# Patient Record
Sex: MALE | Race: WHITE | NOT HISPANIC OR LATINO | URBAN - NONMETROPOLITAN AREA
[De-identification: names, ages, dates, MRNs, and addresses within clinical notes are randomized per-mention and may not be internally consistent; named-entity substitution may affect disease eponyms.]

---

## 2024-06-30 ENCOUNTER — APPOINTMENT (OUTPATIENT)
Dept: CARDIOLOGY | Facility: HOSPITAL | Age: 54
End: 2024-06-30

## 2024-06-30 ENCOUNTER — HOSPITAL ENCOUNTER (EMERGENCY)
Facility: HOSPITAL | Age: 54
Discharge: OTHER INSTITUTION | End: 2024-07-01
Attending: FAMILY MEDICINE

## 2024-06-30 DIAGNOSIS — E11.10 DIABETIC KETOACIDOSIS WITHOUT COMA ASSOCIATED WITH TYPE 2 DIABETES MELLITUS (MULTI): Primary | ICD-10-CM

## 2024-06-30 DIAGNOSIS — R41.89 UNRESPONSIVE: ICD-10-CM

## 2024-06-30 DIAGNOSIS — F19.929 DRUG INTOXICATION WITH COMPLICATION (MULTI): ICD-10-CM

## 2024-06-30 DIAGNOSIS — Z98.890 REQUIRED EMERGENCY INTUBATION: ICD-10-CM

## 2024-06-30 DIAGNOSIS — Z45.2 ENCOUNTER FOR CENTRAL LINE PLACEMENT: ICD-10-CM

## 2024-06-30 LAB
ANION GAP BLDA CALCULATED.4IONS-SCNC: 38 MMO/L (ref 10–25)
BASE EXCESS BLDA CALC-SCNC: -28.2 MMOL/L (ref -2–3)
BASOPHILS # BLD AUTO: 0.1 X10*3/UL (ref 0–0.1)
BASOPHILS NFR BLD AUTO: 0.6 %
BODY TEMPERATURE: ABNORMAL
CA-I BLDA-SCNC: 1.28 MMOL/L (ref 1.1–1.33)
CHLORIDE BLDA-SCNC: 89 MMOL/L (ref 98–107)
EOSINOPHIL # BLD AUTO: 0 X10*3/UL (ref 0–0.7)
EOSINOPHIL NFR BLD AUTO: 0 %
ERYTHROCYTE [DISTWIDTH] IN BLOOD BY AUTOMATED COUNT: 12.8 % (ref 11.5–14.5)
GLUCOSE BLD MANUAL STRIP-MCNC: >600 MG/DL (ref 74–99)
GLUCOSE BLDA-MCNC: >685 MG/DL (ref 74–99)
HCO3 BLDA-SCNC: 1.6 MMOL/L (ref 22–26)
HCT VFR BLD AUTO: 52.2 % (ref 41–52)
HCT VFR BLD EST: 45 % (ref 41–52)
HGB BLD-MCNC: 16 G/DL (ref 13.5–17.5)
HGB BLDA-MCNC: 15.1 G/DL (ref 13.5–17.5)
IMM GRANULOCYTES # BLD AUTO: 0.22 X10*3/UL (ref 0–0.7)
IMM GRANULOCYTES NFR BLD AUTO: 1.4 % (ref 0–0.9)
INHALED O2 CONCENTRATION: 21 %
LACTATE BLDA-SCNC: 4.5 MMOL/L (ref 0.4–2)
LYMPHOCYTES # BLD AUTO: 2.24 X10*3/UL (ref 1.2–4.8)
LYMPHOCYTES NFR BLD AUTO: 14.6 %
MCH RBC QN AUTO: 31.7 PG (ref 26–34)
MCHC RBC AUTO-ENTMCNC: 30.7 G/DL (ref 32–36)
MCV RBC AUTO: 104 FL (ref 80–100)
MONOCYTES # BLD AUTO: 0.87 X10*3/UL (ref 0.1–1)
MONOCYTES NFR BLD AUTO: 5.7 %
NEUTROPHILS # BLD AUTO: 11.96 X10*3/UL (ref 1.2–7.7)
NEUTROPHILS NFR BLD AUTO: 77.7 %
NRBC BLD-RTO: 0 /100 WBCS (ref 0–0)
OXYHGB MFR BLDA: 97.5 % (ref 94–98)
PCO2 BLDA: 7 MM HG (ref 38–42)
PH BLDA: 6.97 PH (ref 7.38–7.42)
PLATELET # BLD AUTO: 453 X10*3/UL (ref 150–450)
PO2 BLDA: 131 MM HG (ref 85–95)
POTASSIUM BLDA-SCNC: 7.7 MMOL/L (ref 3.5–5.3)
RBC # BLD AUTO: 5.04 X10*6/UL (ref 4.5–5.9)
SAO2 % BLDA: 100 % (ref 94–100)
SODIUM BLDA-SCNC: 121 MMOL/L (ref 136–145)
WBC # BLD AUTO: 15.4 X10*3/UL (ref 4.4–11.3)

## 2024-06-30 PROCEDURE — 96374 THER/PROPH/DIAG INJ IV PUSH: CPT | Mod: 59

## 2024-06-30 PROCEDURE — 93005 ELECTROCARDIOGRAM TRACING: CPT

## 2024-06-30 PROCEDURE — 36556 INSERT NON-TUNNEL CV CATH: CPT | Performed by: FAMILY MEDICINE

## 2024-06-30 PROCEDURE — 36415 COLL VENOUS BLD VENIPUNCTURE: CPT | Performed by: FAMILY MEDICINE

## 2024-06-30 PROCEDURE — 80053 COMPREHEN METABOLIC PANEL: CPT | Performed by: FAMILY MEDICINE

## 2024-06-30 PROCEDURE — 85025 COMPLETE CBC W/AUTO DIFF WBC: CPT | Performed by: FAMILY MEDICINE

## 2024-06-30 PROCEDURE — 84484 ASSAY OF TROPONIN QUANT: CPT | Performed by: FAMILY MEDICINE

## 2024-06-30 PROCEDURE — 96376 TX/PRO/DX INJ SAME DRUG ADON: CPT | Mod: 59

## 2024-06-30 PROCEDURE — 82435 ASSAY OF BLOOD CHLORIDE: CPT | Performed by: FAMILY MEDICINE

## 2024-06-30 PROCEDURE — 96375 TX/PRO/DX INJ NEW DRUG ADDON: CPT | Mod: 59

## 2024-06-30 PROCEDURE — 2500000004 HC RX 250 GENERAL PHARMACY W/ HCPCS (ALT 636 FOR OP/ED): Performed by: FAMILY MEDICINE

## 2024-06-30 PROCEDURE — 36600 WITHDRAWAL OF ARTERIAL BLOOD: CPT

## 2024-06-30 PROCEDURE — 99291 CRITICAL CARE FIRST HOUR: CPT

## 2024-06-30 PROCEDURE — 2500000004 HC RX 250 GENERAL PHARMACY W/ HCPCS (ALT 636 FOR OP/ED)

## 2024-06-30 PROCEDURE — 82947 ASSAY GLUCOSE BLOOD QUANT: CPT

## 2024-06-30 RX ORDER — NALOXONE HYDROCHLORIDE 1 MG/ML
INJECTION INTRAMUSCULAR; INTRAVENOUS; SUBCUTANEOUS
Status: COMPLETED
Start: 2024-06-30 | End: 2024-06-30

## 2024-06-30 RX ORDER — LORAZEPAM 2 MG/ML
INJECTION INTRAMUSCULAR
Status: COMPLETED
Start: 2024-06-30 | End: 2024-06-30

## 2024-06-30 RX ORDER — NALOXONE HYDROCHLORIDE 1 MG/ML
2 INJECTION INTRAMUSCULAR; INTRAVENOUS; SUBCUTANEOUS ONCE
Status: COMPLETED | OUTPATIENT
Start: 2024-06-30 | End: 2024-06-30

## 2024-06-30 RX ORDER — LORAZEPAM 2 MG/ML
1 INJECTION INTRAMUSCULAR ONCE
Status: COMPLETED | OUTPATIENT
Start: 2024-06-30 | End: 2024-06-30

## 2024-06-30 ASSESSMENT — PAIN SCALES - GENERAL: PAINLEVEL_OUTOF10: 0 - NO PAIN

## 2024-06-30 ASSESSMENT — PAIN - FUNCTIONAL ASSESSMENT: PAIN_FUNCTIONAL_ASSESSMENT: UNABLE TO SELF-REPORT

## 2024-07-01 ENCOUNTER — APPOINTMENT (OUTPATIENT)
Dept: RADIOLOGY | Facility: HOSPITAL | Age: 54
End: 2024-07-01

## 2024-07-01 ENCOUNTER — HOSPITAL ENCOUNTER (INPATIENT)
Facility: HOSPITAL | Age: 54
LOS: 1 days | Discharge: HOME | End: 2024-07-02
Attending: INTERNAL MEDICINE | Admitting: INTERNAL MEDICINE

## 2024-07-01 ENCOUNTER — APPOINTMENT (OUTPATIENT)
Dept: CARDIOLOGY | Facility: HOSPITAL | Age: 54
End: 2024-07-01

## 2024-07-01 ENCOUNTER — DOCUMENTATION (OUTPATIENT)
Dept: CRITICAL CARE MEDICINE | Facility: HOSPITAL | Age: 54
End: 2024-07-01

## 2024-07-01 VITALS
TEMPERATURE: 97.5 F | WEIGHT: 150 LBS | DIASTOLIC BLOOD PRESSURE: 72 MMHG | OXYGEN SATURATION: 100 % | HEIGHT: 69 IN | BODY MASS INDEX: 22.22 KG/M2 | RESPIRATION RATE: 30 BRPM | SYSTOLIC BLOOD PRESSURE: 112 MMHG | HEART RATE: 92 BPM

## 2024-07-01 DIAGNOSIS — I15.9 SECONDARY HYPERTENSION: ICD-10-CM

## 2024-07-01 DIAGNOSIS — E10.10 DKA, TYPE 1, NOT AT GOAL (MULTI): Primary | ICD-10-CM

## 2024-07-01 DIAGNOSIS — F32.0 CURRENT MILD EPISODE OF MAJOR DEPRESSIVE DISORDER WITHOUT PRIOR EPISODE (CMS-HCC): ICD-10-CM

## 2024-07-01 PROBLEM — E11.10 DIABETIC KETOACIDOSIS WITHOUT COMA ASSOCIATED WITH TYPE 2 DIABETES MELLITUS (MULTI): Status: ACTIVE | Noted: 2024-07-01

## 2024-07-01 PROBLEM — Z98.890 REQUIRED EMERGENCY INTUBATION: Status: ACTIVE | Noted: 2024-07-01

## 2024-07-01 PROBLEM — R41.89 UNRESPONSIVE: Status: ACTIVE | Noted: 2024-07-01

## 2024-07-01 PROBLEM — Z45.2 ENCOUNTER FOR CENTRAL LINE PLACEMENT: Status: ACTIVE | Noted: 2024-07-01

## 2024-07-01 PROBLEM — F19.929: Status: ACTIVE | Noted: 2024-07-01

## 2024-07-01 LAB
ALBUMIN SERPL BCP-MCNC: 2.7 G/DL (ref 3.4–5)
ALBUMIN SERPL BCP-MCNC: 3.2 G/DL (ref 3.4–5)
ALBUMIN SERPL BCP-MCNC: 3.5 G/DL (ref 3.4–5)
ALBUMIN SERPL BCP-MCNC: 4.2 G/DL (ref 3.4–5)
ALP SERPL-CCNC: 205 U/L (ref 33–120)
ALP SERPL-CCNC: 282 U/L (ref 33–120)
ALT SERPL W P-5'-P-CCNC: 104 U/L (ref 10–52)
ALT SERPL W P-5'-P-CCNC: 75 U/L (ref 10–52)
AMPHETAMINES UR QL SCN: ABNORMAL
AMYLASE SERPL-CCNC: 238 U/L (ref 29–103)
ANION GAP BLDA CALCULATED.4IONS-SCNC: 32 MMO/L (ref 10–25)
ANION GAP BLDA CALCULATED.4IONS-SCNC: 35 MMO/L (ref 10–25)
ANION GAP BLDV CALCULATED.4IONS-SCNC: 15 MMOL/L (ref 10–25)
ANION GAP SERPL CALC-SCNC: 11 MMOL/L (ref 10–20)
ANION GAP SERPL CALC-SCNC: 23 MMOL/L (ref 10–20)
ANION GAP SERPL CALC-SCNC: 34 MMOL/L (ref 10–20)
ANION GAP SERPL CALC-SCNC: 43 MMOL/L (ref 10–20)
ANION GAP SERPL CALC-SCNC: 9 MMOL/L (ref 10–20)
ANION GAP SERPL CALC-SCNC: 9 MMOL/L (ref 10–20)
APPEARANCE UR: CLEAR
ARTERIAL PATENCY WRIST A: POSITIVE
AST SERPL W P-5'-P-CCNC: 26 U/L (ref 9–39)
AST SERPL W P-5'-P-CCNC: 39 U/L (ref 9–39)
ATRIAL RATE: 191 BPM
ATRIAL RATE: 78 BPM
B-OH-BUTYR SERPL-SCNC: 8.81 MMOL/L (ref 0.02–0.27)
BARBITURATES UR QL SCN: ABNORMAL
BASE EXCESS BLDA CALC-SCNC: -21.4 MMOL/L (ref -2–3)
BASE EXCESS BLDA CALC-SCNC: -29.6 MMOL/L (ref -2–3)
BASE EXCESS BLDV CALC-SCNC: -6 MMOL/L (ref -2–3)
BENZODIAZ UR QL SCN: ABNORMAL
BILIRUB SERPL-MCNC: 0.3 MG/DL (ref 0–1.2)
BILIRUB SERPL-MCNC: 0.3 MG/DL (ref 0–1.2)
BILIRUB UR STRIP.AUTO-MCNC: NEGATIVE MG/DL
BNP SERPL-MCNC: 136 PG/ML (ref 0–99)
BODY TEMPERATURE: ABNORMAL
BUN SERPL-MCNC: 17 MG/DL (ref 6–23)
BUN SERPL-MCNC: 18 MG/DL (ref 6–23)
BUN SERPL-MCNC: 24 MG/DL (ref 6–23)
BUN SERPL-MCNC: 28 MG/DL (ref 6–23)
BUN SERPL-MCNC: 30 MG/DL (ref 6–23)
BUN SERPL-MCNC: 31 MG/DL (ref 6–23)
BZE UR QL SCN: ABNORMAL
CA-I BLDA-SCNC: 1.19 MMOL/L (ref 1.1–1.33)
CA-I BLDA-SCNC: 1.23 MMOL/L (ref 1.1–1.33)
CA-I BLDV-SCNC: 1.11 MMOL/L (ref 1.1–1.33)
CALCIUM SERPL-MCNC: 7.2 MG/DL (ref 8.6–10.3)
CALCIUM SERPL-MCNC: 7.2 MG/DL (ref 8.6–10.3)
CALCIUM SERPL-MCNC: 7.3 MG/DL (ref 8.6–10.3)
CALCIUM SERPL-MCNC: 7.6 MG/DL (ref 8.6–10.3)
CALCIUM SERPL-MCNC: 7.8 MG/DL (ref 8.6–10.3)
CALCIUM SERPL-MCNC: 9.3 MG/DL (ref 8.6–10.3)
CANNABINOIDS UR QL SCN: ABNORMAL
CARDIAC TROPONIN I PNL SERPL HS: 10 NG/L (ref 0–20)
CARDIAC TROPONIN I PNL SERPL HS: 14 NG/L (ref 0–20)
CHLORIDE BLDA-SCNC: 99 MMOL/L (ref 98–107)
CHLORIDE BLDA-SCNC: 99 MMOL/L (ref 98–107)
CHLORIDE BLDV-SCNC: 112 MMOL/L (ref 98–107)
CHLORIDE SERPL-SCNC: 109 MMOL/L (ref 98–107)
CHLORIDE SERPL-SCNC: 111 MMOL/L (ref 98–107)
CHLORIDE SERPL-SCNC: 113 MMOL/L (ref 98–107)
CHLORIDE SERPL-SCNC: 113 MMOL/L (ref 98–107)
CHLORIDE SERPL-SCNC: 84 MMOL/L (ref 98–107)
CHLORIDE SERPL-SCNC: 98 MMOL/L (ref 98–107)
CK SERPL-CCNC: 99 U/L (ref 0–325)
CO2 SERPL-SCNC: 15 MMOL/L (ref 21–32)
CO2 SERPL-SCNC: 24 MMOL/L (ref 21–32)
CO2 SERPL-SCNC: 24 MMOL/L (ref 21–32)
CO2 SERPL-SCNC: 25 MMOL/L (ref 21–32)
CO2 SERPL-SCNC: 4 MMOL/L (ref 21–32)
CO2 SERPL-SCNC: 6 MMOL/L (ref 21–32)
COLOR UR: ABNORMAL
CREAT SERPL-MCNC: 1.07 MG/DL (ref 0.5–1.3)
CREAT SERPL-MCNC: 1.13 MG/DL (ref 0.5–1.3)
CREAT SERPL-MCNC: 1.32 MG/DL (ref 0.5–1.3)
CREAT SERPL-MCNC: 1.65 MG/DL (ref 0.5–1.3)
CREAT SERPL-MCNC: 1.86 MG/DL (ref 0.5–1.3)
CREAT SERPL-MCNC: 1.94 MG/DL (ref 0.5–1.3)
EGFRCR SERPLBLD CKD-EPI 2021: 41 ML/MIN/1.73M*2
EGFRCR SERPLBLD CKD-EPI 2021: 43 ML/MIN/1.73M*2
EGFRCR SERPLBLD CKD-EPI 2021: 49 ML/MIN/1.73M*2
EGFRCR SERPLBLD CKD-EPI 2021: 64 ML/MIN/1.73M*2
EGFRCR SERPLBLD CKD-EPI 2021: 78 ML/MIN/1.73M*2
EGFRCR SERPLBLD CKD-EPI 2021: 83 ML/MIN/1.73M*2
ERYTHROCYTE [DISTWIDTH] IN BLOOD BY AUTOMATED COUNT: 12.7 % (ref 11.5–14.5)
FENTANYL+NORFENTANYL UR QL SCN: ABNORMAL
GLUCOSE BLD MANUAL STRIP-MCNC: 138 MG/DL (ref 74–99)
GLUCOSE BLD MANUAL STRIP-MCNC: 142 MG/DL (ref 74–99)
GLUCOSE BLD MANUAL STRIP-MCNC: 151 MG/DL (ref 74–99)
GLUCOSE BLD MANUAL STRIP-MCNC: 153 MG/DL (ref 74–99)
GLUCOSE BLD MANUAL STRIP-MCNC: 156 MG/DL (ref 74–99)
GLUCOSE BLD MANUAL STRIP-MCNC: 157 MG/DL (ref 74–99)
GLUCOSE BLD MANUAL STRIP-MCNC: 158 MG/DL (ref 74–99)
GLUCOSE BLD MANUAL STRIP-MCNC: 161 MG/DL (ref 74–99)
GLUCOSE BLD MANUAL STRIP-MCNC: 163 MG/DL (ref 74–99)
GLUCOSE BLD MANUAL STRIP-MCNC: 177 MG/DL (ref 74–99)
GLUCOSE BLD MANUAL STRIP-MCNC: 193 MG/DL (ref 74–99)
GLUCOSE BLD MANUAL STRIP-MCNC: 194 MG/DL (ref 74–99)
GLUCOSE BLD MANUAL STRIP-MCNC: 204 MG/DL (ref 74–99)
GLUCOSE BLD MANUAL STRIP-MCNC: 213 MG/DL (ref 74–99)
GLUCOSE BLD MANUAL STRIP-MCNC: 255 MG/DL (ref 74–99)
GLUCOSE BLD MANUAL STRIP-MCNC: 285 MG/DL (ref 74–99)
GLUCOSE BLD MANUAL STRIP-MCNC: 452 MG/DL (ref 74–99)
GLUCOSE BLD MANUAL STRIP-MCNC: >600 MG/DL (ref 74–99)
GLUCOSE BLD MANUAL STRIP-MCNC: >600 MG/DL (ref 74–99)
GLUCOSE BLDA-MCNC: >685 MG/DL (ref 74–99)
GLUCOSE BLDA-MCNC: >685 MG/DL (ref 74–99)
GLUCOSE BLDV-MCNC: 170 MG/DL (ref 74–99)
GLUCOSE SERPL-MCNC: 139 MG/DL (ref 74–99)
GLUCOSE SERPL-MCNC: 145 MG/DL (ref 74–99)
GLUCOSE SERPL-MCNC: 155 MG/DL (ref 74–99)
GLUCOSE SERPL-MCNC: 292 MG/DL (ref 74–99)
GLUCOSE SERPL-MCNC: 549 MG/DL (ref 74–99)
GLUCOSE SERPL-MCNC: 763 MG/DL (ref 74–99)
GLUCOSE SERPL-MCNC: 996 MG/DL (ref 74–99)
GLUCOSE UR STRIP.AUTO-MCNC: ABNORMAL MG/DL
GRAN CASTS #/AREA UR COMP ASSIST: ABNORMAL /LPF
HCO3 BLDA-SCNC: 3.6 MMOL/L (ref 22–26)
HCO3 BLDA-SCNC: 5.4 MMOL/L (ref 22–26)
HCO3 BLDV-SCNC: 18.8 MMOL/L (ref 22–26)
HCT VFR BLD AUTO: 42.2 % (ref 41–52)
HCT VFR BLD EST: 38 % (ref 41–52)
HCT VFR BLD EST: 41 % (ref 41–52)
HCT VFR BLD EST: 44 % (ref 41–52)
HGB BLD-MCNC: 13.2 G/DL (ref 13.5–17.5)
HGB BLDA-MCNC: 13.6 G/DL (ref 13.5–17.5)
HGB BLDA-MCNC: 14.5 G/DL (ref 13.5–17.5)
HGB BLDV-MCNC: 12.7 G/DL (ref 13.5–17.5)
HOLD SPECIMEN: NORMAL
INHALED O2 CONCENTRATION: 100 %
INHALED O2 CONCENTRATION: 30 %
INHALED O2 CONCENTRATION: 30 %
KETONES UR STRIP.AUTO-MCNC: ABNORMAL MG/DL
LACTATE BLDA-SCNC: 4.7 MMOL/L (ref 0.4–2)
LACTATE BLDA-SCNC: 6 MMOL/L (ref 0.4–2)
LACTATE BLDV-SCNC: 3.7 MMOL/L (ref 0.4–2)
LACTATE BLDV-SCNC: 6 MMOL/L (ref 0.4–2)
LACTATE SERPL-SCNC: 1.7 MMOL/L (ref 0.4–2)
LACTATE SERPL-SCNC: 1.9 MMOL/L (ref 0.4–2)
LACTATE SERPL-SCNC: 2.2 MMOL/L (ref 0.4–2)
LACTATE SERPL-SCNC: 4 MMOL/L (ref 0.4–2)
LACTATE SERPL-SCNC: 4.4 MMOL/L (ref 0.4–2)
LACTATE SERPL-SCNC: 5.4 MMOL/L (ref 0.4–2)
LACTATE SERPL-SCNC: 5.5 MMOL/L (ref 0.4–2)
LEUKOCYTE ESTERASE UR QL STRIP.AUTO: NEGATIVE
LIPASE SERPL-CCNC: 92 U/L (ref 9–82)
MAGNESIUM SERPL-MCNC: 2.27 MG/DL (ref 1.6–2.4)
MAGNESIUM SERPL-MCNC: 2.65 MG/DL (ref 1.6–2.4)
MCH RBC QN AUTO: 30.8 PG (ref 26–34)
MCHC RBC AUTO-ENTMCNC: 31.3 G/DL (ref 32–36)
MCV RBC AUTO: 99 FL (ref 80–100)
METHADONE UR QL SCN: ABNORMAL
NITRITE UR QL STRIP.AUTO: NEGATIVE
NRBC BLD-RTO: 0 /100 WBCS (ref 0–0)
OPIATES UR QL SCN: ABNORMAL
OXYCODONE+OXYMORPHONE UR QL SCN: ABNORMAL
OXYHGB MFR BLDA: 97.9 % (ref 94–98)
OXYHGB MFR BLDA: 98.2 % (ref 94–98)
OXYHGB MFR BLDV: 82.1 % (ref 45–75)
P AXIS: -15 DEGREES
P AXIS: 68 DEGREES
P OFFSET: 191 MS
P ONSET: 138 MS
PCO2 BLDA: 16 MM HG (ref 38–42)
PCO2 BLDA: 21 MM HG (ref 38–42)
PCO2 BLDV: 34 MM HG (ref 41–51)
PCP UR QL SCN: ABNORMAL
PEEP CMH2O: 5 CM H2O
PH BLDA: 6.84 PH (ref 7.38–7.42)
PH BLDA: 7.14 PH (ref 7.38–7.42)
PH BLDV: 7.35 PH (ref 7.33–7.43)
PH UR STRIP.AUTO: 5 [PH]
PHOSPHATE SERPL-MCNC: 1.2 MG/DL (ref 2.5–4.9)
PHOSPHATE SERPL-MCNC: 2.5 MG/DL (ref 2.5–4.9)
PHOSPHATE SERPL-MCNC: 5.5 MG/DL (ref 2.5–4.9)
PHOSPHATE SERPL-MCNC: 9.3 MG/DL (ref 2.5–4.9)
PLATELET # BLD AUTO: 333 X10*3/UL (ref 150–450)
PO2 BLDA: 126 MM HG (ref 85–95)
PO2 BLDA: 534 MM HG (ref 85–95)
PO2 BLDV: 43 MM HG (ref 35–45)
POTASSIUM BLDA-SCNC: 4.7 MMOL/L (ref 3.5–5.3)
POTASSIUM BLDA-SCNC: 6 MMOL/L (ref 3.5–5.3)
POTASSIUM BLDV-SCNC: 4.1 MMOL/L (ref 3.5–5.3)
POTASSIUM SERPL-SCNC: 3.6 MMOL/L (ref 3.5–5.3)
POTASSIUM SERPL-SCNC: 3.7 MMOL/L (ref 3.5–5.3)
POTASSIUM SERPL-SCNC: 3.9 MMOL/L (ref 3.5–5.3)
POTASSIUM SERPL-SCNC: 4.1 MMOL/L (ref 3.5–5.3)
POTASSIUM SERPL-SCNC: 4.5 MMOL/L (ref 3.5–5.3)
POTASSIUM SERPL-SCNC: 6.7 MMOL/L (ref 3.5–5.3)
PR INTERVAL: 174 MS
PROT SERPL-MCNC: 6 G/DL (ref 6.4–8.2)
PROT SERPL-MCNC: 7.5 G/DL (ref 6.4–8.2)
PROT UR STRIP.AUTO-MCNC: NEGATIVE MG/DL
Q ONSET: 206 MS
Q ONSET: 225 MS
QRS COUNT: 13 BEATS
QRS COUNT: 32 BEATS
QRS DURATION: 106 MS
QRS DURATION: 86 MS
QT INTERVAL: 270 MS
QT INTERVAL: 438 MS
QTC CALCULATION(BAZETT): 481 MS
QTC CALCULATION(BAZETT): 499 MS
QTC FREDERICIA: 397 MS
QTC FREDERICIA: 478 MS
R AXIS: -44 DEGREES
R AXIS: 40 DEGREES
RBC # BLD AUTO: 4.28 X10*6/UL (ref 4.5–5.9)
RBC # UR STRIP.AUTO: ABNORMAL /UL
RBC #/AREA URNS AUTO: ABNORMAL /HPF
SAO2 % BLDA: 100 % (ref 94–100)
SAO2 % BLDA: 100 % (ref 94–100)
SAO2 % BLDV: 83 % (ref 45–75)
SARS-COV-2 RNA RESP QL NAA+PROBE: NOT DETECTED
SODIUM BLDA-SCNC: 129 MMOL/L (ref 136–145)
SODIUM BLDA-SCNC: 135 MMOL/L (ref 136–145)
SODIUM BLDV-SCNC: 142 MMOL/L (ref 136–145)
SODIUM SERPL-SCNC: 124 MMOL/L (ref 136–145)
SODIUM SERPL-SCNC: 133 MMOL/L (ref 136–145)
SODIUM SERPL-SCNC: 141 MMOL/L (ref 136–145)
SODIUM SERPL-SCNC: 142 MMOL/L (ref 136–145)
SODIUM SERPL-SCNC: 143 MMOL/L (ref 136–145)
SODIUM SERPL-SCNC: 144 MMOL/L (ref 136–145)
SP GR UR STRIP.AUTO: 1.02
SPECIMEN DRAWN FROM PATIENT: ABNORMAL
SQUAMOUS #/AREA URNS AUTO: ABNORMAL /HPF
T AXIS: 3 DEGREES
T AXIS: 63 DEGREES
T OFFSET: 341 MS
T OFFSET: 444 MS
TIDAL VOLUME: 450 ML
UROBILINOGEN UR STRIP.AUTO-MCNC: <2 MG/DL
VENTILATOR MODE: ABNORMAL
VENTILATOR RATE: 30 BPM
VENTRICULAR RATE: 191 BPM
VENTRICULAR RATE: 78 BPM
WBC # BLD AUTO: 24.2 X10*3/UL (ref 4.4–11.3)
WBC #/AREA URNS AUTO: ABNORMAL /HPF

## 2024-07-01 PROCEDURE — 31500 INSERT EMERGENCY AIRWAY: CPT

## 2024-07-01 PROCEDURE — 2500000005 HC RX 250 GENERAL PHARMACY W/O HCPCS

## 2024-07-01 PROCEDURE — 31720 CLEARANCE OF AIRWAYS: CPT

## 2024-07-01 PROCEDURE — 84132 ASSAY OF SERUM POTASSIUM: CPT

## 2024-07-01 PROCEDURE — 73551 X-RAY EXAM OF FEMUR 1: CPT | Mod: RIGHT SIDE | Performed by: RADIOLOGY

## 2024-07-01 PROCEDURE — 94681 O2 UPTK CO2 OUTP % O2 XTRC: CPT

## 2024-07-01 PROCEDURE — 37799 UNLISTED PX VASCULAR SURGERY: CPT

## 2024-07-01 PROCEDURE — 80307 DRUG TEST PRSMV CHEM ANLYZR: CPT | Performed by: FAMILY MEDICINE

## 2024-07-01 PROCEDURE — 36415 COLL VENOUS BLD VENIPUNCTURE: CPT | Performed by: FAMILY MEDICINE

## 2024-07-01 PROCEDURE — 70450 CT HEAD/BRAIN W/O DYE: CPT | Performed by: RADIOLOGY

## 2024-07-01 PROCEDURE — 2500000002 HC RX 250 W HCPCS SELF ADMINISTERED DRUGS (ALT 637 FOR MEDICARE OP, ALT 636 FOR OP/ED)

## 2024-07-01 PROCEDURE — 80048 BASIC METABOLIC PNL TOTAL CA: CPT | Mod: CCI

## 2024-07-01 PROCEDURE — 94002 VENT MGMT INPAT INIT DAY: CPT

## 2024-07-01 PROCEDURE — C9113 INJ PANTOPRAZOLE SODIUM, VIA: HCPCS

## 2024-07-01 PROCEDURE — 84484 ASSAY OF TROPONIN QUANT: CPT

## 2024-07-01 PROCEDURE — 87075 CULTR BACTERIA EXCEPT BLOOD: CPT | Mod: CONLAB | Performed by: FAMILY MEDICINE

## 2024-07-01 PROCEDURE — 71045 X-RAY EXAM CHEST 1 VIEW: CPT

## 2024-07-01 PROCEDURE — 2500000004 HC RX 250 GENERAL PHARMACY W/ HCPCS (ALT 636 FOR OP/ED)

## 2024-07-01 PROCEDURE — 51702 INSERT TEMP BLADDER CATH: CPT | Mod: XS | Performed by: FAMILY MEDICINE

## 2024-07-01 PROCEDURE — 84100 ASSAY OF PHOSPHORUS: CPT | Performed by: FAMILY MEDICINE

## 2024-07-01 PROCEDURE — 2500000004 HC RX 250 GENERAL PHARMACY W/ HCPCS (ALT 636 FOR OP/ED): Performed by: FAMILY MEDICINE

## 2024-07-01 PROCEDURE — 93005 ELECTROCARDIOGRAM TRACING: CPT

## 2024-07-01 PROCEDURE — 87635 SARS-COV-2 COVID-19 AMP PRB: CPT

## 2024-07-01 PROCEDURE — 99291 CRITICAL CARE FIRST HOUR: CPT | Performed by: INTERNAL MEDICINE

## 2024-07-01 PROCEDURE — 36600 WITHDRAWAL OF ARTERIAL BLOOD: CPT

## 2024-07-01 PROCEDURE — 99291 CRITICAL CARE FIRST HOUR: CPT

## 2024-07-01 PROCEDURE — 82010 KETONE BODYS QUAN: CPT | Mod: GEALAB

## 2024-07-01 PROCEDURE — 83735 ASSAY OF MAGNESIUM: CPT | Performed by: FAMILY MEDICINE

## 2024-07-01 PROCEDURE — 82150 ASSAY OF AMYLASE: CPT

## 2024-07-01 PROCEDURE — 83690 ASSAY OF LIPASE: CPT

## 2024-07-01 PROCEDURE — 94799 UNLISTED PULMONARY SVC/PX: CPT

## 2024-07-01 PROCEDURE — 2500000005 HC RX 250 GENERAL PHARMACY W/O HCPCS: Performed by: INTERNAL MEDICINE

## 2024-07-01 PROCEDURE — 83735 ASSAY OF MAGNESIUM: CPT

## 2024-07-01 PROCEDURE — 87081 CULTURE SCREEN ONLY: CPT | Mod: GEALAB

## 2024-07-01 PROCEDURE — 83880 ASSAY OF NATRIURETIC PEPTIDE: CPT

## 2024-07-01 PROCEDURE — 83605 ASSAY OF LACTIC ACID: CPT

## 2024-07-01 PROCEDURE — 5A1935Z RESPIRATORY VENTILATION, LESS THAN 24 CONSECUTIVE HOURS: ICD-10-PCS

## 2024-07-01 PROCEDURE — 85027 COMPLETE CBC AUTOMATED: CPT

## 2024-07-01 PROCEDURE — 96376 TX/PRO/DX INJ SAME DRUG ADON: CPT | Mod: 59

## 2024-07-01 PROCEDURE — 87086 URINE CULTURE/COLONY COUNT: CPT | Mod: CONLAB | Performed by: FAMILY MEDICINE

## 2024-07-01 PROCEDURE — 82550 ASSAY OF CK (CPK): CPT

## 2024-07-01 PROCEDURE — 70450 CT HEAD/BRAIN W/O DYE: CPT

## 2024-07-01 PROCEDURE — 82947 ASSAY GLUCOSE BLOOD QUANT: CPT

## 2024-07-01 PROCEDURE — 2500000005 HC RX 250 GENERAL PHARMACY W/O HCPCS: Performed by: FAMILY MEDICINE

## 2024-07-01 PROCEDURE — 2020000001 HC ICU ROOM DAILY

## 2024-07-01 PROCEDURE — 76705 ECHO EXAM OF ABDOMEN: CPT | Performed by: STUDENT IN AN ORGANIZED HEALTH CARE EDUCATION/TRAINING PROGRAM

## 2024-07-01 PROCEDURE — 96375 TX/PRO/DX INJ NEW DRUG ADDON: CPT | Mod: 59

## 2024-07-01 PROCEDURE — 99292 CRITICAL CARE ADDL 30 MIN: CPT | Performed by: INTERNAL MEDICINE

## 2024-07-01 PROCEDURE — 73551 X-RAY EXAM OF FEMUR 1: CPT | Mod: RT

## 2024-07-01 PROCEDURE — 96365 THER/PROPH/DIAG IV INF INIT: CPT | Mod: 59

## 2024-07-01 PROCEDURE — 71045 X-RAY EXAM CHEST 1 VIEW: CPT | Performed by: RADIOLOGY

## 2024-07-01 PROCEDURE — 83605 ASSAY OF LACTIC ACID: CPT | Performed by: FAMILY MEDICINE

## 2024-07-01 PROCEDURE — 81001 URINALYSIS AUTO W/SCOPE: CPT | Performed by: FAMILY MEDICINE

## 2024-07-01 PROCEDURE — 84100 ASSAY OF PHOSPHORUS: CPT

## 2024-07-01 PROCEDURE — 0BH17EZ INSERTION OF ENDOTRACHEAL AIRWAY INTO TRACHEA, VIA NATURAL OR ARTIFICIAL OPENING: ICD-10-PCS

## 2024-07-01 PROCEDURE — 71045 X-RAY EXAM CHEST 1 VIEW: CPT | Performed by: STUDENT IN AN ORGANIZED HEALTH CARE EDUCATION/TRAINING PROGRAM

## 2024-07-01 PROCEDURE — 80053 COMPREHEN METABOLIC PANEL: CPT | Performed by: FAMILY MEDICINE

## 2024-07-01 PROCEDURE — 76705 ECHO EXAM OF ABDOMEN: CPT

## 2024-07-01 PROCEDURE — 94760 N-INVAS EAR/PLS OXIMETRY 1: CPT

## 2024-07-01 RX ORDER — DEXTROSE MONOHYDRATE AND SODIUM CHLORIDE 5; .45 G/100ML; G/100ML
200 INJECTION, SOLUTION INTRAVENOUS CONTINUOUS
Status: DISCONTINUED | OUTPATIENT
Start: 2024-07-01 | End: 2024-07-02

## 2024-07-01 RX ORDER — DEXTROSE 50 % IN WATER (D50W) INTRAVENOUS SYRINGE
12.5
Status: DISCONTINUED | OUTPATIENT
Start: 2024-07-01 | End: 2024-07-02 | Stop reason: HOSPADM

## 2024-07-01 RX ORDER — INSULIN LISPRO 100 [IU]/ML
15 INJECTION, SOLUTION INTRAVENOUS; SUBCUTANEOUS
Status: ON HOLD | COMMUNITY
Start: 2024-03-22 | End: 2024-07-02

## 2024-07-01 RX ORDER — SUCCINYLCHOLINE CHLORIDE 20 MG/ML
INJECTION INTRAMUSCULAR; INTRAVENOUS
Status: COMPLETED
Start: 2024-07-01 | End: 2024-07-01

## 2024-07-01 RX ORDER — HEPARIN SODIUM 5000 [USP'U]/ML
5000 INJECTION, SOLUTION INTRAVENOUS; SUBCUTANEOUS EVERY 8 HOURS SCHEDULED
Status: DISCONTINUED | OUTPATIENT
Start: 2024-07-01 | End: 2024-07-02 | Stop reason: HOSPADM

## 2024-07-01 RX ORDER — VANCOMYCIN HYDROCHLORIDE 1 G/20ML
INJECTION, POWDER, LYOPHILIZED, FOR SOLUTION INTRAVENOUS DAILY PRN
Status: DISCONTINUED | OUTPATIENT
Start: 2024-07-01 | End: 2024-07-02

## 2024-07-01 RX ORDER — PROPOFOL 10 MG/ML
5-50 INJECTION, EMULSION INTRAVENOUS CONTINUOUS
Status: DISCONTINUED | OUTPATIENT
Start: 2024-07-01 | End: 2024-07-01

## 2024-07-01 RX ORDER — PROPOFOL 10 MG/ML
5 INJECTION, EMULSION INTRAVENOUS CONTINUOUS
Status: DISCONTINUED | OUTPATIENT
Start: 2024-07-01 | End: 2024-07-01 | Stop reason: HOSPADM

## 2024-07-01 RX ORDER — MIDAZOLAM HYDROCHLORIDE 1 MG/ML
2 INJECTION, SOLUTION INTRAMUSCULAR; INTRAVENOUS ONCE
Status: COMPLETED | OUTPATIENT
Start: 2024-07-01 | End: 2024-07-01

## 2024-07-01 RX ORDER — POLYETHYLENE GLYCOL 3350 17 G/17G
17 POWDER, FOR SOLUTION ORAL DAILY
Status: DISCONTINUED | OUTPATIENT
Start: 2024-07-01 | End: 2024-07-02 | Stop reason: HOSPADM

## 2024-07-01 RX ORDER — INDOMETHACIN 25 MG/1
CAPSULE ORAL
Status: COMPLETED
Start: 2024-07-01 | End: 2024-07-01

## 2024-07-01 RX ORDER — SODIUM CHLORIDE 9 MG/ML
250 INJECTION, SOLUTION INTRAVENOUS CONTINUOUS
Status: DISCONTINUED | OUTPATIENT
Start: 2024-07-01 | End: 2024-07-01

## 2024-07-01 RX ORDER — SUMATRIPTAN SUCCINATE 100 MG/1
100 TABLET ORAL DAILY PRN
COMMUNITY
Start: 2024-06-12 | End: 2025-06-12

## 2024-07-01 RX ORDER — SUCCINYLCHOLINE CHLORIDE 20 MG/ML
100 INJECTION INTRAMUSCULAR; INTRAVENOUS ONCE
Status: COMPLETED | OUTPATIENT
Start: 2024-07-01 | End: 2024-07-01

## 2024-07-01 RX ORDER — FENTANYL CITRATE-0.9 % NACL/PF 10 MCG/ML
25-200 PLASTIC BAG, INJECTION (ML) INTRAVENOUS CONTINUOUS
Status: DISCONTINUED | OUTPATIENT
Start: 2024-07-01 | End: 2024-07-01

## 2024-07-01 RX ORDER — POTASSIUM CHLORIDE 14.9 MG/ML
20 INJECTION INTRAVENOUS ONCE
Status: COMPLETED | OUTPATIENT
Start: 2024-07-01 | End: 2024-07-01

## 2024-07-01 RX ORDER — DEXTROSE 50 % IN WATER (D50W) INTRAVENOUS SYRINGE
25
Status: DISCONTINUED | OUTPATIENT
Start: 2024-07-01 | End: 2024-07-02 | Stop reason: HOSPADM

## 2024-07-01 RX ORDER — INSULIN LISPRO 100 [IU]/ML
0-5 INJECTION, SOLUTION INTRAVENOUS; SUBCUTANEOUS EVERY 4 HOURS
Status: DISCONTINUED | OUTPATIENT
Start: 2024-07-01 | End: 2024-07-01

## 2024-07-01 RX ORDER — PROPOFOL 10 MG/ML
INJECTION, EMULSION INTRAVENOUS
Status: COMPLETED
Start: 2024-07-01 | End: 2024-07-01

## 2024-07-01 RX ORDER — IBUPROFEN 200 MG
1 CAPSULE ORAL
COMMUNITY
Start: 2023-12-04

## 2024-07-01 RX ORDER — INDOMETHACIN 25 MG/1
50 CAPSULE ORAL ONCE
Status: COMPLETED | OUTPATIENT
Start: 2024-07-01 | End: 2024-07-01

## 2024-07-01 RX ORDER — SODIUM CHLORIDE, SODIUM LACTATE, POTASSIUM CHLORIDE, CALCIUM CHLORIDE 600; 310; 30; 20 MG/100ML; MG/100ML; MG/100ML; MG/100ML
50 INJECTION, SOLUTION INTRAVENOUS CONTINUOUS
Status: ACTIVE | OUTPATIENT
Start: 2024-07-02 | End: 2024-07-02

## 2024-07-01 RX ORDER — DEXTROSE MONOHYDRATE 100 MG/ML
150 INJECTION, SOLUTION INTRAVENOUS CONTINUOUS
Status: DISCONTINUED | OUTPATIENT
Start: 2024-07-01 | End: 2024-07-02

## 2024-07-01 RX ORDER — ATORVASTATIN CALCIUM 40 MG/1
40 TABLET, FILM COATED ORAL NIGHTLY
COMMUNITY
Start: 2024-06-12 | End: 2025-06-12

## 2024-07-01 RX ORDER — INDOMETHACIN 25 MG/1
50 CAPSULE ORAL ONCE
Status: CANCELLED | OUTPATIENT
Start: 2024-07-01 | End: 2024-07-01

## 2024-07-01 RX ORDER — ETOMIDATE 2 MG/ML
20 INJECTION INTRAVENOUS ONCE
Status: COMPLETED | OUTPATIENT
Start: 2024-07-01 | End: 2024-07-01

## 2024-07-01 RX ORDER — INSULIN LISPRO 100 [IU]/ML
0-5 INJECTION, SOLUTION INTRAVENOUS; SUBCUTANEOUS EVERY 4 HOURS
Status: DISCONTINUED | OUTPATIENT
Start: 2024-07-01 | End: 2024-07-02 | Stop reason: HOSPADM

## 2024-07-01 RX ORDER — ETOMIDATE 2 MG/ML
0.3 INJECTION INTRAVENOUS ONCE
Status: CANCELLED | OUTPATIENT
Start: 2024-07-01 | End: 2024-07-01

## 2024-07-01 RX ORDER — PANTOPRAZOLE SODIUM 40 MG/10ML
40 INJECTION, POWDER, LYOPHILIZED, FOR SOLUTION INTRAVENOUS
Status: DISCONTINUED | OUTPATIENT
Start: 2024-07-01 | End: 2024-07-02

## 2024-07-01 RX ORDER — LISINOPRIL 10 MG/1
10 TABLET ORAL DAILY
COMMUNITY
Start: 2024-03-22 | End: 2024-07-02 | Stop reason: HOSPADM

## 2024-07-01 RX ORDER — INSULIN GLARGINE 100 [IU]/ML
45 INJECTION, SOLUTION SUBCUTANEOUS NIGHTLY
Status: DISCONTINUED | OUTPATIENT
Start: 2024-07-01 | End: 2024-07-02

## 2024-07-01 RX ORDER — INSULIN GLARGINE 300 [IU]/ML
45 INJECTION, SOLUTION SUBCUTANEOUS DAILY
Status: ON HOLD | COMMUNITY
Start: 2024-03-15 | End: 2024-07-02

## 2024-07-01 RX ORDER — DEXTROSE MONOHYDRATE AND SODIUM CHLORIDE 5; .9 G/100ML; G/100ML
150 INJECTION, SOLUTION INTRAVENOUS CONTINUOUS
Status: DISCONTINUED | OUTPATIENT
Start: 2024-07-01 | End: 2024-07-01

## 2024-07-01 RX ORDER — MIDAZOLAM HYDROCHLORIDE 1 MG/ML
INJECTION, SOLUTION INTRAMUSCULAR; INTRAVENOUS
Status: COMPLETED
Start: 2024-07-01 | End: 2024-07-01

## 2024-07-01 RX ORDER — INSULIN ASPART 100 [IU]/ML
15 INJECTION, SOLUTION INTRAVENOUS; SUBCUTANEOUS
Status: ON HOLD | COMMUNITY
Start: 2024-02-11 | End: 2024-07-01 | Stop reason: WASHOUT

## 2024-07-01 RX ORDER — PREGABALIN 150 MG/1
150 CAPSULE ORAL 2 TIMES DAILY
COMMUNITY
Start: 2024-03-22

## 2024-07-01 RX ORDER — BLOOD-GLUCOSE,RECEIVER,CONT
1 EACH MISCELLANEOUS AS NEEDED
COMMUNITY
Start: 2024-01-18

## 2024-07-01 RX ORDER — VANCOMYCIN HYDROCHLORIDE 1 G/200ML
1000 INJECTION, SOLUTION INTRAVENOUS ONCE
Status: DISCONTINUED | OUTPATIENT
Start: 2024-07-01 | End: 2024-07-01 | Stop reason: HOSPADM

## 2024-07-01 RX ORDER — VANCOMYCIN HYDROCHLORIDE 1 G/200ML
1000 INJECTION, SOLUTION INTRAVENOUS EVERY 24 HOURS
Status: DISCONTINUED | OUTPATIENT
Start: 2024-07-02 | End: 2024-07-02

## 2024-07-01 RX ORDER — ETOMIDATE 2 MG/ML
INJECTION INTRAVENOUS
Status: COMPLETED
Start: 2024-07-01 | End: 2024-07-01

## 2024-07-01 RX ORDER — SODIUM CHLORIDE, SODIUM LACTATE, POTASSIUM CHLORIDE, CALCIUM CHLORIDE 600; 310; 30; 20 MG/100ML; MG/100ML; MG/100ML; MG/100ML
250 INJECTION, SOLUTION INTRAVENOUS CONTINUOUS
Status: DISCONTINUED | OUTPATIENT
Start: 2024-07-01 | End: 2024-07-01

## 2024-07-01 SDOH — SOCIAL STABILITY: SOCIAL INSECURITY: HAS ANYONE EVER THREATENED TO HURT YOUR FAMILY OR YOUR PETS?: UNABLE TO ASSESS

## 2024-07-01 SDOH — SOCIAL STABILITY: SOCIAL INSECURITY: ABUSE: ADULT

## 2024-07-01 SDOH — SOCIAL STABILITY: SOCIAL INSECURITY: ARE YOU OR HAVE YOU BEEN THREATENED OR ABUSED PHYSICALLY, EMOTIONALLY, OR SEXUALLY BY ANYONE?: UNABLE TO ASSESS

## 2024-07-01 SDOH — SOCIAL STABILITY: SOCIAL INSECURITY: HAVE YOU HAD ANY THOUGHTS OF HARMING ANYONE ELSE?: UNABLE TO ASSESS

## 2024-07-01 SDOH — SOCIAL STABILITY: SOCIAL INSECURITY

## 2024-07-01 SDOH — SOCIAL STABILITY: SOCIAL INSECURITY: DOES ANYONE TRY TO KEEP YOU FROM HAVING/CONTACTING OTHER FRIENDS OR DOING THINGS OUTSIDE YOUR HOME?: UNABLE TO ASSESS

## 2024-07-01 SDOH — SOCIAL STABILITY: SOCIAL INSECURITY: DO YOU FEEL UNSAFE GOING BACK TO THE PLACE WHERE YOU ARE LIVING?: UNABLE TO ASSESS

## 2024-07-01 SDOH — SOCIAL STABILITY: SOCIAL INSECURITY: DO YOU FEEL ANYONE HAS EXPLOITED OR TAKEN ADVANTAGE OF YOU FINANCIALLY OR OF YOUR PERSONAL PROPERTY?: UNABLE TO ASSESS

## 2024-07-01 SDOH — SOCIAL STABILITY: SOCIAL INSECURITY: ARE THERE ANY APPARENT SIGNS OF INJURIES/BEHAVIORS THAT COULD BE RELATED TO ABUSE/NEGLECT?: UNABLE TO ASSESS

## 2024-07-01 SDOH — ECONOMIC STABILITY: HOUSING INSECURITY: IN THE LAST 12 MONTHS, HOW MANY PLACES HAVE YOU LIVED?: 1

## 2024-07-01 SDOH — SOCIAL STABILITY: SOCIAL INSECURITY: HAVE YOU HAD THOUGHTS OF HARMING ANYONE ELSE?: NO

## 2024-07-01 SDOH — ECONOMIC STABILITY: HOUSING INSECURITY
IN THE LAST 12 MONTHS, WAS THERE A TIME WHEN YOU DID NOT HAVE A STEADY PLACE TO SLEEP OR SLEPT IN A SHELTER (INCLUDING NOW)?: PATIENT UNABLE TO ANSWER

## 2024-07-01 SDOH — SOCIAL STABILITY: SOCIAL INSECURITY: WERE YOU ABLE TO COMPLETE ALL THE BEHAVIORAL HEALTH SCREENINGS?: NO

## 2024-07-01 SDOH — ECONOMIC STABILITY: INCOME INSECURITY
HOW HARD IS IT FOR YOU TO PAY FOR THE VERY BASICS LIKE FOOD, HOUSING, MEDICAL CARE, AND HEATING?: PATIENT UNABLE TO ANSWER

## 2024-07-01 SDOH — ECONOMIC STABILITY: TRANSPORTATION INSECURITY
IN THE PAST 12 MONTHS, HAS LACK OF TRANSPORTATION KEPT YOU FROM MEETINGS, WORK, OR FROM GETTING THINGS NEEDED FOR DAILY LIVING?: PATIENT UNABLE TO ANSWER

## 2024-07-01 SDOH — ECONOMIC STABILITY: TRANSPORTATION INSECURITY
IN THE PAST 12 MONTHS, HAS THE LACK OF TRANSPORTATION KEPT YOU FROM MEDICAL APPOINTMENTS OR FROM GETTING MEDICATIONS?: PATIENT UNABLE TO ANSWER

## 2024-07-01 SDOH — ECONOMIC STABILITY: INCOME INSECURITY
IN THE LAST 12 MONTHS, WAS THERE A TIME WHEN YOU WERE NOT ABLE TO PAY THE MORTGAGE OR RENT ON TIME?: PATIENT UNABLE TO ANSWER

## 2024-07-01 ASSESSMENT — LIFESTYLE VARIABLES
HOW MANY STANDARD DRINKS CONTAINING ALCOHOL DO YOU HAVE ON A TYPICAL DAY: PATIENT UNABLE TO ANSWER
AUDIT-C TOTAL SCORE: -1
HOW OFTEN DO YOU HAVE 6 OR MORE DRINKS ON ONE OCCASION: PATIENT UNABLE TO ANSWER
HOW OFTEN DO YOU HAVE A DRINK CONTAINING ALCOHOL: PATIENT UNABLE TO ANSWER
SKIP TO QUESTIONS 9-10: 0
AUDIT-C TOTAL SCORE: -1

## 2024-07-01 ASSESSMENT — PAIN SCALES - GENERAL
PAINLEVEL_OUTOF10: 0 - NO PAIN

## 2024-07-01 ASSESSMENT — ACTIVITIES OF DAILY LIVING (ADL)
ADEQUATE_TO_COMPLETE_ADL: UNABLE TO ASSESS
BATHING: UNABLE TO ASSESS
HEARING - RIGHT EAR: UNABLE TO ASSESS
HEARING - LEFT EAR: UNABLE TO ASSESS
DRESSING YOURSELF: UNABLE TO ASSESS
LACK_OF_TRANSPORTATION: PATIENT UNABLE TO ANSWER
GROOMING: UNABLE TO ASSESS
WALKS IN HOME: UNABLE TO ASSESS
TOILETING: UNABLE TO ASSESS
PATIENT'S MEMORY ADEQUATE TO SAFELY COMPLETE DAILY ACTIVITIES?: UNABLE TO ASSESS
JUDGMENT_ADEQUATE_SAFELY_COMPLETE_DAILY_ACTIVITIES: UNABLE TO ASSESS
FEEDING YOURSELF: UNABLE TO ASSESS

## 2024-07-01 ASSESSMENT — COGNITIVE AND FUNCTIONAL STATUS - GENERAL
DRESSING REGULAR UPPER BODY CLOTHING: A LITTLE
DAILY ACTIVITIY SCORE: 18
EATING MEALS: A LITTLE
MOBILITY SCORE: 19
HELP NEEDED FOR BATHING: A LITTLE
TURNING FROM BACK TO SIDE WHILE IN FLAT BAD: A LITTLE
CLIMB 3 TO 5 STEPS WITH RAILING: A LITTLE
TOILETING: A LITTLE
STANDING UP FROM CHAIR USING ARMS: A LITTLE
PATIENT BASELINE BEDBOUND: UNABLE TO ASSESS AT THIS TIME
PERSONAL GROOMING: A LITTLE
WALKING IN HOSPITAL ROOM: A LITTLE
DRESSING REGULAR LOWER BODY CLOTHING: A LITTLE
MOVING TO AND FROM BED TO CHAIR: A LITTLE

## 2024-07-01 ASSESSMENT — PAIN - FUNCTIONAL ASSESSMENT
PAIN_FUNCTIONAL_ASSESSMENT: CPOT (CRITICAL CARE PAIN OBSERVATION TOOL)
PAIN_FUNCTIONAL_ASSESSMENT: 0-10
PAIN_FUNCTIONAL_ASSESSMENT: CPOT (CRITICAL CARE PAIN OBSERVATION TOOL)

## 2024-07-01 ASSESSMENT — COLUMBIA-SUICIDE SEVERITY RATING SCALE - C-SSRS
6. HAVE YOU EVER DONE ANYTHING, STARTED TO DO ANYTHING, OR PREPARED TO DO ANYTHING TO END YOUR LIFE?: NO
1. IN THE PAST MONTH, HAVE YOU WISHED YOU WERE DEAD OR WISHED YOU COULD GO TO SLEEP AND NOT WAKE UP?: NO
6. HAVE YOU EVER DONE ANYTHING, STARTED TO DO ANYTHING, OR PREPARED TO DO ANYTHING TO END YOUR LIFE?: NO
2. HAVE YOU ACTUALLY HAD ANY THOUGHTS OF KILLING YOURSELF?: NO
1. IN THE PAST MONTH, HAVE YOU WISHED YOU WERE DEAD OR WISHED YOU COULD GO TO SLEEP AND NOT WAKE UP?: NO
2. HAVE YOU ACTUALLY HAD ANY THOUGHTS OF KILLING YOURSELF?: NO

## 2024-07-01 ASSESSMENT — PATIENT HEALTH QUESTIONNAIRE - PHQ9
2. FEELING DOWN, DEPRESSED OR HOPELESS: NOT AT ALL
SUM OF ALL RESPONSES TO PHQ9 QUESTIONS 1 & 2: 0
1. LITTLE INTEREST OR PLEASURE IN DOING THINGS: NOT AT ALL

## 2024-07-01 ASSESSMENT — PAIN SCALES - WONG BAKER: WONGBAKER_NUMERICALRESPONSE: HURTS LITTLE MORE

## 2024-07-01 NOTE — ED PROVIDER NOTES
HPI   Chief Complaint   Patient presents with    unresponsive     Pt unresponsive upon arrival, responsive after 2nd narcan       HPI  This patient was brought into the emergency room by the squad unresponsive.  Was his friend and she noticed that he was not acting well all day today, she apparently went to check on him and found him confused and then actually found him unresponsive she called the squad patient was subsequent brought to the ER for evaluation.  At arrival patient was unresponsive he only responded to sternal rub.  He was also confused when the patient has history of diabetes hypertension and he was hyperventilating with dry buccal mucosa I suspect fever deep in DKA.  EKG sent from the field show artifact of tall T waves sinus rhythm rate of 100.  Left atrial abnormality.  Left axis deviation.  Incomplete right bundle block but no ST-T evaluation.  Further history is not available and cannot be obtained patient was unresponsive.  Family history: Unobtainable  Social history: Unobtainable  Review of system: Unobtainable           Butler Coma Scale Score: 10                     Patient History   Past Medical History:   Diagnosis Date    Diabetes mellitus (Multi)     MI (myocardial infarction) (Multi)     pt has a friend at bedside who knows he is dm and had an mi. thatis all hx she knows     History reviewed. No pertinent surgical history.  No family history on file.  Social History     Tobacco Use    Smoking status: Unknown    Smokeless tobacco: Not on file   Vaping Use    Vaping status: Unknown   Substance Use Topics    Alcohol use: Not on file    Drug use: Not on file     Comment: unable to assess   EKG obtained at 2243 hrs. showed sinus rhythm with no artifact limiting interpretation of data.  Left axis deviation.  Incomplete right bundle branch block.  Nonspecific T wave abnormality.  No STEMI.  Abnormal EKG.  Tall T waves.  I read this EKG.    Physical Exam   ED Triage Vitals   Temp Heart Rate  Respirations BP   -- 06/30/24 2249 06/30/24 2249 06/30/24 2249    (!) 120 (!) 24 113/67      SpO2 Temp src Heart Rate Source Patient Position   06/30/24 2249 -- -- --   100 %         BP Location FiO2 (%)     -- 06/30/24 2330      28 %       Physical Exam    CONSTITUTIONAL: Acutely sick looking male patient unresponsive hyperventilating in fetal position.  Rigid.  Pupils are equal respond to light.  Fundi grossly unremarkable.  ED was not moving his arms and legs actually rigid and stiff and unresponsive.       HENMT: Unresponsive pupils are equal round respond to light.  No ear nose discharge.  Bucca mucosa dry afraid about patient protect to protect his airway.    EYES: Clear bilaterally, pupils equal, round and reactive to light.     CARDIOVASCULAR: Regular rate and rhythm. No friction rub or murmur good peripheral pulses no peripheral edema. Nontender Homans sign negative. RESPIRATORY: Hyperventilating combative incoherent does not follow command intermittently responsive pulling IV lines and oxygen of hyperventilating symmetrical breath sounds bilaterally no evidence of chest wall injury.  Moving air well bilaterally.  GASTROINTESTINAL: Abdomen soft positive bowel sounds no pulsatile mass noted.  No distention noted.  Full assessment for tenderness not reliable as patient is unresponsive     GENITOURINARY:  No costovertebral angle tenderness. MUSCULOSKELETAL:Combative unresponsive initially combative and Narcan given and became unresponsive again unable to protect airway does not follow commands moves arms and legs no evidence of injury to arms or legs or neck or back.      NEUROLOGICAL: Unresponsive initially responded to Narcan briefly became agitated combative combative and then will become unresponsive intermittently.  Pupils are equal respond light bilaterally stress examination marked limited.  No evidence of injury.  CT pending    SKIN: Skin normal color for race, warm, dry and intact. No evidence of  trauma or lesions.     PSYCHIATRIC: Unresponsive not protecting airway Narcan given brief partial response to Narcan woke up, became severely combative pulling IV incoherent vigorously moving arms and legs 10 to follow-up with bed or risk of injury to the staff as he was seeing his arms not realizing what he was doing.    HEME/LYMPH: No adenopathy or splenomegaly.        CRITICAL CARE    VITAL SIGNS:                  DISPOSITION      ED Course & MDM   ED Course as of 07/01/24 0325 Mon Jul 01, 2024 0221 CT head wo IV contrast [SP]      ED Course User Index  [SP] Myrtle Liriano MD         Diagnoses as of 07/01/24 0325   Diabetic ketoacidosis without coma associated with type 2 diabetes mellitus (Multi)   Unresponsive   Drug intoxication with complication (Multi)   Required emergency intubation   Encounter for central line placement   As described above patient was brought in unresponsive hyperventilating with history of diabetes hypertension.  Dry buccal mucosa suspected diabetic ketoacidosis establish IV quickly and making sure that he does not aspirate.    Patient was given Narcan 2 mg with slight response however after second 2 mg Narcan he woke up but very combative confused did not follow command started to pull his oxygen and IV out and difficult to maintain he was confused and extremely agitated and then become unresponsive.  There was concern about patient's secure airway and also safety.  I did give him 1 mg Ativan with only transiently helpful slightly but anytime anybody tried to try to examine he will come very agitated and combative.  We were still able to stop his to IV line and blood was drawn after restraining him but it was difficult to continue this patient care and it was decided to intubate the patient as nursing staff was concerned about patient uncooperative combative confused with altered mental status.    I ordered DKA protocol as his blood sugar was too high to be measured on her  glucometer.  Patient was getting 2 L of IV fluid wide open before even considering giving him any insulin.  Patient also has ABG done which showed pH of 6.97.  21 pO2 was 534 oxygenation was 99% sodium 129 potassium 6.0 glucose still more than 685 on ABG.   pCO2 7, pO2 131 potassium 7.7 glucose 685 more than 635 on ABG which is beyond the detection limit.  Patient has been given getting boluses of IV fluid through 2 peripheral lines, his treatment has been already initiated on DKA protocol and I requested immediate transfer to ICU by helicopter to any ICU accepting facility as patient was acutely sick and need to be transferred to higher level facility with intensivist involvement.  It took a while for the transfer center to contact Janneth Lyons at [facility] as they had ICU bed available [facility] however intensivist was only available on the phone  and I requested to talk to the intensivist.  While waiting for the intensivist I intubated the patient immediately because of his combativeness pulling IV out pulling oxygen off fighting and restless.  And almost falling out of bed and we will worry about patient's protecting airway and safety.  .  Patient however was pulling his IV out he was combative also and did not want to, oxygen on we could not keep him in secure position a, nursing staff was also worried about his safety and worried about protecting airway as result patient was intubated for his safety.  He was successfully intubated and single attempt and we put him on the ventilator when I immediately contacted intensivist on-call at [facility] after getting information from Janneth Lyons, midlevel discussed case with Dr. Warren immediately after intubation.  He gave  instruction for ventilator setting to respiratory therapist and wanted respiratory rate to be 30.  I also ordered additional dose- second dose of bicarb as patient has been already given 1 ampoule of bicarb.  Patient repeat ABG done about half an hour after  intubation showed pH of 6.84, pCO2 was 21 pO2 was 534 glucose more than 685 for ABG panel, B&B on the detection limit.  I was notified by helicopter to be a arriving in about half an hour .    As discussed with the intensivist patient getting 2 additional liters of IV fluid lactated Ringer blood cultures were already has already been drawn and IV antibiotic has  IV Zosyn and vancomycin are being administered.  Perez catheter inserted and urine cultures obtained.      Patient urine drug screen is positive for amphetamine, urinalysis showed glucose more than 593+ ketones 2+80 but there was no history of UTI leukocyte nitrate negative.  Patient white count of 15.4 H&H is 16 and 52     Platelet count 453.  Serum glucose 996 on chemistry sodium 124 potassium 6.7 bicarb 4 alkaline phos 282.  His troponin was 10.    Postintubation chest x-ray showed endotracheal tube about 6 cm above ada even though I kept the tube at 2150 cm on the lip as result we advanced the endotracheal tube 3 cm further down to keep it above 3 to 4 cm above the ada.     Nursing staff needed additional IV line as is that I reproduced triple-lumen central venous catheter to right femoral approach successful in single attempt and central line was ready to be used for the nursing staff for additional treatment and hydration as well as strips.      The helicopter arrived to  the patient was able to pick put a central line and successfully 7 minutes before helicopter ride.  Patient was subsequent sent with CT of the head and helicopter because the patient to take him to [facility].   Patient required intubation to protect his airway and also to protect him from harming himself and harming other people and to provide care to the patient as we could not evaluate and treat the patient without him with until it was safe for him to be evaluated and also safe for the person and also he is protected for his airway to prevent any respiratory arrest.   In addition he was pulling IV out pulling oxygen of swinging his arms and confused when he was given Narcan after Narcan he became unresponsive but Tala will not allow to move his arms and legs and wrist staying in a rigid position for him to position concerning about his safety and aspiration.  Patient also need to be have a CAT scan done of the head which could not be done without patient is properly stabilized and required intubation and history hydration and treatment for severe diabetic ketoacidosis initially elevated positive in addition to multiple other abnormalities patient exhibiting.     Patient required minimal 45 minutes of critical care collectively.        Medical Decision Making      Procedure  Procedures     Myrtle Liriano MD  07/01/24 0325       Myrtle Lirinao MD  07/01/24 0338       Myrtle Liriano MD  07/01/24 0574

## 2024-07-01 NOTE — ED PROCEDURE NOTE
Procedure  Central Line    Performed by: Myrtle Liriano MD  Authorized by: Myrtle Liriano MD    Consent:     Consent obtained:  Verbal and emergent situation  Universal protocol:     Procedure explained and questions answered to patient or proxy's satisfaction: yes      Relevant documents present and verified: yes      Test results available: yes      Imaging studies available: yes      Required blood products, implants, devices, and special equipment available: yes      Site/side marked: yes      Immediately prior to procedure, a time out was called: yes      Patient identity confirmed:  Hospital-assigned identification number  Pre-procedure details:     Indication(s): central venous access and insufficient peripheral access      Skin preparation:  Chlorhexidine    Skin preparation agent: Skin preparation agent completely dried prior to procedure    Sedation:     Sedation type:  Deep  Anesthesia:     Anesthesia method:  Topical application  Procedure details:     Location:  R femoral    Patient position:  Supine    Landmarks identified: yes      Ultrasound guidance: yes      Ultrasound guidance timing: prior to insertion      Number of attempts:  1    Successful placement: yes    Post-procedure details:     Post-procedure:  Dressing applied    Assessment:  Blood return through all ports    Procedure completion:  Tolerated well, no immediate complications               Myrtle Liriano MD  07/01/24 0219

## 2024-07-01 NOTE — DISCHARGE INSTRUCTIONS
Patient being transferred to [facility] ICU by helicopter Case discussed with  intensivist at [facility] on-call over the phone.  Case was also discussed with Janneth Lyons midlevel who accepted the patient for Dr. Anderson..  Ventilator setting adjusted according to intensivist recommendation immediately after intubation

## 2024-07-01 NOTE — PROGRESS NOTES
Vancomycin Dosing by Pharmacy- INITIAL    Jim Forte is a 53 y.o. year old male who Pharmacy has been consulted for vancomycin dosing for leukocytosis. Based on the patient's indication and renal status this patient will be dosed based on a goal AUC of 400-600.     Renal function is currently declining.    There were no vitals taken for this visit.     Lab Results   Component Value Date    CREATININE 1.86 (H) 2024        Patient weight is as follows: There were no vitals filed for this visit.    Cultures:  No results found for the encounter in last 14 days.        No intake/output data recorded.  I/O during current shift:  No intake/output data recorded.    Temp (24hrs), Av.4 °C (97.5 °F), Min:36.4 °C (97.5 °F), Max:36.4 °C (97.5 °F)         Assessment/Plan     Patient will not be given a loading dose.  Will initiate vancomycin maintenance,  1000 mg every 24 hours.    This dosing regimen is predicted by [employer] to result in the following pharmacokinetic parameters:    Regimen: 1000 mg IV every 24 hours.  Start time: 14:38 on 2024  Exposure target: AUC24 (range)400-600 mg/L.hr   AUC24,ss: 475 mg/L.hr  Probability of AUC24 > 400: 70 %  Ctrough,ss: 14.7 mg/L  Probability of Ctrough,ss > 20: 22 %  Probability of nephrotoxicity (Lodise JAXSON ): 10 %    Follow-up level will be ordered on  at 5a unless clinically indicated sooner.  Will continue to monitor renal function daily while on vancomycin and order serum creatinine at least every 48 hours if not already ordered.  Follow for continued vancomycin needs, clinical response, and signs/symptoms of toxicity.       Nacho Piña, PharmD

## 2024-07-01 NOTE — NURSING NOTE
Pt received from OSH,pt asynchronous with vent, placed on AC/VC+,waiting for sedation meds per MD/RN. ABG drawn.

## 2024-07-01 NOTE — ED NOTES
2258 ABG drawn by RT with pH of 6.97, pCO2 of 7, pO2 of 131, K+ of 7.7, Glu >685, Lac of 4.5, and HCO3 of 1.6. ABG given to Dr Liriano with no new orders.   0126 Patient intubated by Dr. Liriano with RT assist. Patient pre oxygenated with BMV. Patient was intubated with 7.0 tube, 22 EDDIE. Positive color change on end tidal detector, bilateral breath sounds, SpO2 of 100%.    0130 RT was asked by Dr. Evelin Anderson at [facility] (pending transfer to [facility] ICU) to place patient on vent with a rate of 30, Dr. Liriano agreeable. Vent settings were tidal volume of 450, rate of 30, PEEP of 5, FiO2 of 100%.   0221 ABG drawn by RT with pH of 6.84, pCO2 of 21, pO2 of 534, Glu >685, Lac of 4.7, HCO3 of 3.6. No changes made per Dr. Liriano.   0226 Per Dr. Liriano, RT to advance ETT to 25 EDDIE.   0230 RT to assist with vent transport to and from CT.

## 2024-07-01 NOTE — PROGRESS NOTES
07/01/24 1435   Discharge Planning   Living Arrangements Alone   Support Systems Friends/neighbors;Parent   Assistance Needed Patient is from home alone, lives in a trailer on a piece of land he owns in [place]. Patient was in [place], [place], working with a friend (Laurie) when he fell ill and was taken to [facility] ED and transferred to [facility] ICU. At baseline he is independent with ADLs, drives, is on room air and uses no assistive devices for ambulation. Per friend, Laurie, patient's mother was contacted via [email or web address] messenger by friend and asked to call hospital to speak with MD. Patient's friend does believe he has insurance. Awaiting call from patient's mother to confirm patient's insurance. Patient is currently on an insulin drip.   Type of Residence Private residence   Who is requesting discharge planning? Provider   Home or Post Acute Services Other (Comment)  (TBD)   Patient expects to be discharged to: TBD once patient is more medically stable. LSW consulted for patient being listed as self pay.   Does the patient need discharge transport arranged? Yes   RoundTrip coordination needed? Yes   Has discharge transport been arranged? No   Financial Resource Strain   How hard is it for you to pay for the very basics like food, housing, medical care, and heating? Pt Unable   Housing Stability   In the last 12 months, was there a time when you were not able to pay the mortgage or rent on time? Pt Unable   In the last 12 months, how many places have you lived? 2  (per friend Laurie patient has lived with his sister and now on his own.)   In the last 12 months, was there a time when you did not have a steady place to sleep or slept in a shelter (including now)? Pt Unable   Transportation Needs   In the past 12 months, has lack of transportation kept you from medical appointments or from getting medications? Pt Unable   In the past 12 months, has lack of transportation kept you from meetings, work, or from getting  things needed for daily living? Pt Unable

## 2024-07-01 NOTE — H&P
Intensive Care Unit:  Initial History and Physical Note               Patient: Jim Forte, Age: 53 y.o., SEX:  male, MRN:[id number], ROOM:11/11-A                                                                   Code: Full Code                                                     Admitted On:  7/1/2024                                                    Admitting Dx:  DKA, type 1, not at goal (Multi) [E10.10]                                                                   PCP:   No Assigned PCP Generic Provider, MD                                                         Attending:   Evelin Anderson MD      Chief complain:  AMS, intubated for Airway protection in the setting of DKA.     HPI:     Jim Forte is a 53 y.o. with a Medical History of essential hypertension, HLD, Type 2 DM, diabetic neuropathy CAD s/p stent placement , GERD and depression who presented to [place] ED on account of AMS, unresponsiveness, received x2 narcan, became agitated and was intubated for airway protection.     As per review of chart, the patient was believed to have arrived unresponsive to ED at [facility] ED and responded to only sternal rub. His friend found him confused, and unresponsive before calling EMS who brought the patient to [place] ED. Done time is unknown. While in the ED he received two courses of narcan before he regain consciousness and became quite agitated. He was subsequently given Ativan. He however remained confused and was reported to be puling IV lines and and intranasal O2, despite being on restraints. At which point the patient was intubated and sedated for air way protection. POCT glucose check,was unreadable, abg done showed a ph of 6.9.  He received 2L LR bolus, stated on insulin drip and air flighted to [facility] ICU for further management.     ED course:  Vital signs;   RR 24, /67, spo2 100 on NC    Labs;   CBC;  wbc 15.4, neutrophils 11.96, HB 16, , Plt 453,   CMP: ,  k 6.7, cl 84, bicarb 4, BUN 31, Scr 1.86, phosp 9.3, mag 2.65, ALP  282, , lactate 4.4,   ABG: PH 6.97/7/ 131  UA: ketones 2+, granular cast, glu 3+   Micro, blood culture, urine culture pending     Imaging   CXR: no acute process, eTT 6.9 cm above the ada (NB ET adjusted from 21--25cm)   CT head; No acute intracranial pathology   EKG: LAD, incomplete RBBB, no ST changes   Intervention intubated, ivf 2L LR bolus, zosyn, vancomycin, insulin drip, bicarb 50 x2  Vent setting peep 5/ , RR 30, tejeda catheter, CVC femoral     ICU Arrival:  Patient was unresponsive on presentation, received fentanyl and versed en route to the hospital. Intubated and sedated. BG unreadable. Of note patient received versed and fentanyl en route the [facility] ICU   Ventilatory setting: peep 5, , RR 16    ROS: Differed as patient is unconscious and cannot do give response for ROS    Past Medical History:     has a past medical history of Diabetes mellitus (Multi) and MI (myocardial infarction) (Multi).   essential hypertension, HLD, Type 2 DM, diabetic neuropathy CAD s/p stent placement , GERD and depression    Allergies:   Not on File   NKDA    Meds:   No medications prior to admission.       Past surgical history:    No past surgical history on file.     Social history:      Social History     Socioeconomic History    Marital status: Legally      Spouse name: Not on file    Number of children: Not on file    Years of education: Not on file    Highest education level: Not on file   Occupational History    Not on file   Tobacco Use    Smoking status: Unknown    Smokeless tobacco: Not on file   Vaping Use    Vaping status: Unknown   Substance and Sexual Activity    Alcohol use: Not on file    Drug use: Not on file     Comment: unable to assess    Sexual activity: Not on file   Other Topics Concern    Not on file   Social History Narrative    Not on file     Social Determinants of Health     Financial Resource  Strain: High Risk (3/14/2024)    Received from [facility] ([facility])    Financial Resource Strain     Sometimes people find that their income does not quite cover their living costs.  In the last 12 months, has this happened to you?: Not on file     What is your current work situation? : Not on file   Food Insecurity: Low Risk (3/14/2024)    Received from [facility] ([facility])    Food Insecurity     Within the past 12 months we worried whether our food would run out before we got the money to buy more.: Never true     Within the past 12 months the food we bought just didn't last and we didn't have money to get more. : Never true   Transportation Needs: Not on file   Physical Activity: Not on file   Stress: Low Risk (3/14/2024)    Received from [facility] ([facility])    Stress     Over the last 2 weeks, how often have you been bothered by the following problems: feeling nervous, anxious, on edge?: Not at all     Over the last 2 weeks, how often have you been bothered by the following problems: Not being able to stop or control worrying?: Not at all   Social Connections: Low Risk (3/14/2024)    Received from [facility] ([facility])    Social Connections     How often do you feel isolated from others?: Hardly ever   Intimate Partner Violence: Not on file   Housing Stability: Not on file        Social Determinants of Health with Concerns     Tobacco Use: High Risk (6/28/2022)    Received from [facility]    Patient History     Smoking Tobacco Use: Every Day     Smokeless Tobacco Use: Never     Passive Exposure: Not on file   Alcohol Use: Not on file   Financial Resource Strain: High Risk (3/14/2024)    Received from [facility] ([facility])    Financial Resource Strain     Sometimes people find that their income does not quite cover their living costs.  In the last 12 months, has this happened to you?: Not on file     What is your current work situation? : Not on file    Transportation Needs: Not on file   Physical Activity: Not on file   Intimate Partner Violence: Not on file   Depression: At risk (6/12/2024)    Received from [facility] ([facility])    PHQ-2     PHQ-9 Total Score: 16   Housing Stability: Not on file   Utilities: At Risk (3/14/2024)    Received from [facility] ([facility])    Utilities     In the past 12 months has the electric, gas, oil, or water company threatened to shut off services in your home?: Not on file   Digital Equity: Not on file   Health Literacy: Not on file        Current medications:      Current Facility-Administered Medications:     fentanyl (Sublimaze) 1000 mcg in sodium chloride 0.9% 100 mL (10 mcg/mL) infusion (premix),  mcg/hr, intravenous, Continuous, Daniel Redding MD, Last Rate: 2.5 mL/hr at 07/01/24 0543, 25 mcg/hr at 07/01/24 0543    heparin (porcine) injection 5,000 Units, 5,000 Units, subcutaneous, q8h KOMAL, Daniel Redding MD    insulin regular 100 unit/100 mL (1 unit/mL) in 0.9 % NaCl infusion, 0-15 Units/hr, intravenous, Continuous, Daniel Redding MD, Last Rate: 5 mL/hr at 07/01/24 0539, 5 Units/hr at 07/01/24 0539    lactated Ringer's infusion, 250 mL/hr, intravenous, Continuous, Daniel Redding MD, Last Rate: 250 mL/hr at 07/01/24 0541, 250 mL/hr at 07/01/24 0541    oxygen (O2) therapy, , inhalation, Continuous - Inhalation, Daniel Redding MD, 30 percent at 07/01/24 0515    piperacillin-tazobactam-dextrose (Zosyn) IV 3.375 g, 3.375 g, intravenous, q6h, Daniel Redding MD    polyethylene glycol (Glycolax, Miralax) packet 17 g, 17 g, oral, Daily, Daniel Redding MD    propofol (Diprivan) infusion, 5-50 mcg/kg/min, intravenous, Continuous, Daniel Redding MD, Last Rate: 12.24 mL/hr at 07/01/24 0541, 30 mcg/kg/min at 07/01/24 0541    [START ON 7/2/2024] vancomycin (Vancocin) 1,000 mg in dextrose 5% water 200 mL, 1,000 mg, intravenous, q24h, Daniel Redding MD    vancomycin (Vancocin)  pharmacy to dose - pharmacy monitoring, , miscellaneous, Daily PRN, Daniel Redding MD      Objective:    Vitals:   Blood pressure 97/73, pulse 84, temperature 36.5 °C (97.7 °F), temperature source Temporal, resp. rate 14, height 1.829 m (6'), weight 71.8 kg (158 lb 4.6 oz), SpO2 100%.     I/Os:     Intake/Output Summary (Last 24 hours) at 7/1/2024 0600  Last data filed at 7/1/2024 0500  Gross per 24 hour   Intake 2205 ml   Output 950 ml   Net 1255 ml       Ventilatory Settings:   Vent Mode: Assist control/Volume control plus  FiO2 (%):  [28 %-100 %] 30 %  S RR:  [30] 30  S VT:  [450 mL] 450 mL  PEEP/CPAP (cm H2O):  [5 cm H20] 5 cm H20  MAP (cm H2O):  [9-12] 9     Physical Examination:  GE: lying comfortable in bed, in NAD,   HEENT; AT/NC, EOMI, no conjunctival pallor, anicteric sclera, moist mucous membrane, pinpoint pupil, sluggish rxn to light. OG tube in place. ET tube at 23cm  Neck; supple neck, no LAD/JVD  Chest; Good air entry b/l, harsh breath sounds heard b/l  CVS; s1 and s2, no MGR, RRR  Abd; soft, NT/ND, +BS, no organomegaly, No guarding/rebound tenderness  Ext; no pedal edema/cyanosis/clubbing, pulses intact, cold extremities.   Neuro; sedated, can not do complete examination as patient is sedated.       Laboratory:    Laboratory finding:   Results from last 72 hours   Lab Units 07/01/24  0417 07/01/24  0104 06/30/24  2341   SODIUM mmol/L 133*  --  124*   POTASSIUM mmol/L 4.5  --  6.7*   CHLORIDE mmol/L 98  --  84*   CO2 mmol/L 6*  --  4*   BUN mg/dL 30*  --  31*   CREATININE mg/dL 1.94*  --  1.86*   GLUCOSE mg/dL 763*  --  996*   CALCIUM mg/dL 7.8*  --  9.3   ANION GAP mmol/L 34*  --  43*   EGFR mL/min/1.73m*2 41*  --  43*   PHOSPHORUS mg/dL 5.5* 9.3*  --       Results from last 72 hours   Lab Units 07/01/24  0417 06/30/24  2341   WBC AUTO x10*3/uL 24.2* 15.4*   HEMOGLOBIN g/dL 13.2* 16.0   MCV fL 99 104*   HEMATOCRIT % 42.2 52.2*   PLATELETS AUTO x10*3/uL 333 453*   NEUTROS PCT AUTO %  --  77.7    LYMPHS PCT AUTO %  --  14.6   MONOS PCT AUTO %  --  5.7   EOS PCT AUTO %  --  0.0        Other Laboratory results:   Lab Results   Component Value Date    CALCIUM 7.8 (L) 07/01/2024    PHOS 5.5 (H) 07/01/2024    ALBUMIN 3.5 07/01/2024      Results from last 72 hours   Lab Units 07/01/24  0417 06/30/24  2341   TROPHS ng/L 14 10     Lab Results   Component Value Date     (H) 07/01/2024        Imaging:   XR femur right 1 view  Narrative: Interpreted By:  Lucy Castro,   STUDY:  XR FEMUR RIGHT 1 VIEW; ;  7/1/2024 4:26 am      INDICATION:  Signs/Symptoms:placement of right femoral CVC.      COMPARISON:  None.      ACCESSION NUMBER(S):  [id number]      ORDERING CLINICIAN:  LEANNE SANDERSON      FINDINGS:  Single AP view right femur. There is a vascular catheter overlying  the right groin and right shazia sacrum. The tip of the catheter is not  imaged.      No acute fracture or malalignment. Mild right hip osteoarthrosis.      Impression: The tip of the right femoral vascular catheter is not imaged.  Consider further evaluation with pelvis x-ray if tip placement  confirmation is desired.          MACRO:  None      Signed by: Lucy Castro 7/1/2024 4:40 AM  Dictation workstation:   [id number]  XR chest 1 view  Narrative: Interpreted By:  Lucy Castro,   STUDY:  XR CHEST 1 VIEW;  7/1/2024 4:26 am      INDICATION:  Signs/Symptoms:OGT placement.      COMPARISON:  07/01/2024 at 1:06 a.m.      ACCESSION NUMBER(S):  [id number]      ORDERING CLINICIAN:  LEANNE SANDERSON      FINDINGS:  The endotracheal tube terminates approximately 3.9 cm above the  ada. The nasogastric tube courses below the diaphragm with tip in  the region of the gastric body.      CARDIOMEDIASTINAL SILHOUETTE:  Cardiomediastinal silhouette is normal in size and configuration.      LUNGS:  No pulmonary consolidation, pleural effusion or pneumothorax.      ABDOMEN:  No remarkable upper abdominal findings.      BONES:  No acute osseous  abnormality.      Impression: No acute cardiopulmonary process.  Tubes and lines as above.      MACRO:  None      Signed by: Lucy Castro 7/1/2024 4:39 AM  Dictation workstation:   [id number]  CT head wo IV contrast  Narrative: Interpreted By:  Lucy Castro,   STUDY:  CT HEAD WO IV CONTRAST;  7/1/2024 2:49 am      INDICATION:  Signs/Symptoms:Altered Mental Status.      COMPARISON:  None.      ACCESSION NUMBER(S):  [id number]      ORDERING CLINICIAN:  CARL BENVAIDES      TECHNIQUE:  Axial noncontrast CT images of the head.      FINDINGS:  BRAIN PARENCHYMA:  Gray-white matter interfaces are preserved. No  mass effect or midline shift.      HEMORRHAGE: No acute intracranial hemorrhage.  VENTRICLES and EXTRA-AXIAL SPACES: The ventricles and sulci are  within normal limits in size for brain volume. No abnormal extraaxial  fluid collection. EXTRACRANIAL SOFT TISSUES: Within normal limits.  PARANASAL SINUSES/MASTOIDS:  The visualized paranasal sinuses and  mastoid air cells are aerated. CALVARIUM: No depressed skull  fracture. No destructive osseous lesion.      OTHER FINDINGS: None.      Impression: No acute intracranial abnormality.          MACRO:  None      Signed by: Lucy Castro 7/1/2024 3:27 AM  Dictation workstation:   [id number]  XR chest 1 view  Narrative: Interpreted By:  Ivan Kahn,   STUDY:  XR CHEST 1 VIEW;  7/1/2024 1:37 am      INDICATION:  Signs/Symptoms:post intubation.      COMPARISON:  None.      ACCESSION NUMBER(S):  [id number]      ORDERING CLINICIAN:  CALR BENAVIDES      FINDINGS:  - ETT termination: 6.9 cm above ada.      The cardiomediastinal silhouette and pulmonary vasculature are within  normal limits. No consolidation, pleural effusion or pneumothorax.          Impression: No acute radiographic abnormality of the chest.          MACRO:  None.      Signed by: Ivan Kahn 7/1/2024 2:14 AM  Dictation workstation:   [id number]       Microbiology:   No results found for  "the last 90 days.    No results found for: \"URINECULTURE\", \"BLOODCULT\", \"CSFCULTSMEAR\"                 No lab exists for component: \"AGALPCRNB\"     Assessment and Plan:    Problem list:   Principal Problem:    DKA, type 1, not at goal (Multi)      Assessment and Plan:   53 year old man with a medical history of essential HTN, T2DM, neuropathy, CAD sp stent placement and GERD and depression. who initially presented to [facility] ED on account of AMS and unresponsiveness received x2 narcan, became agitated and was intubated for airway protection. BG too large to be detected, UA positive for ketones, patient transferred to the ICU for DKA mgt.     Neurology:   #Acute metabolic encephalopathy  :: multifactorial: DKA vs ?OD vs Sepsis  -Intubated and sedated  -CT head: no acute intracranial process  -Sp narcan x2 in [facility] ed  -S/p ativan, narcan x2  -S/p Propofol, midazolam  -Continue propofol  -Continue fentanyl  -Hold gabapentin, hold duloxetine  -Keep RAAS 0--2  -Insulin drip  -Watch for ICU delirium  -daily SAT, and daily SBT    #depression   Not on medications     Cardiovascular:   DRU    #essential hypertension;   -hold lisinopril in the setting of Adan and hydration     # HLD  CAD s/p stent placement   -continue atorvastatin 40mg daily (not on ASA, BB)    Respiratory:   DRU    Endocrinology:   #DKA vs HHS  #HAGMA  -History of T2DM+ neuropathy, last A1c 13.3 (3/24), noncompliance  -UA: ketones 2+, granular cast, glu 3+   -S/p bicarb x2  -AG 36, lactate 4.4,   -Insulin drip   -Continue IVF LR at 250cc/hr  -Repeat ABG  -RFP q3hrs   -BHG ca, mg, phosh  - A1c  -BG q1hr    Gastrointestinal:  #Transaminitits   ;;likely dt severe dehydration in the setting of DKA vs hyperglycemia  -,   -S/p 2L LR bolus  -Continue IVF 250cc/hr LR  -Repeat cmp in the AM    # GERD  Not on any medication    Heme Onc:  #leukocytosis  :: infections vs reactive  -Sp vanc + zosyn  -Continue vancomycin   -Continue Zosyn  -Blood " culture pending   -Urine culture pending     Infectious Disease:   # Sepsis  -SIRS tachycardia ( bpm), tachypnea RR 24, wbc 13, lactic acidosis, unclear source of infection  -S/p vanc and zosyn in the ed  [ ] blood culture  [ ]  urine culture  -Continue zosyn ( 7/1/24-)  -Continue vancomycin(7/1/24-)    Genitourinary:   # [place] on CKD  :: likely due to dehydration   -Scr 1.86 (Baseline scr 1.58)  --Hold nephrotoxic medications, hold lisinopril in the setting of [place]  - Continue IVF as per endocrine section  - monitor I&0    # Electrolyte derangement  #Hyponatremia na 124,  correct for glucose 138  # Hyperkalemia, K 6.7  -Continue IVF at above   -Continue to monitor RFP     Musculoskeletal:   DRU    F: PRN bolus, LR 250cc/hr  E: Replete as needed  N: NPO  G: None  VTE: heparin subcutaneous q8hrs  Antibiotics: -Continue zosyn ( 7/1/24-), Continue vancomycin(7/1/24-)  Lines/Tejeda: 2PIV, Rt femoral line, tejeda present (7/1/2024)    Code:   Full Code    Disposition: Admitted to the ICU for DKA, intubated for airway protection. The patient is at risk for immediate hemodynamic collapse necessitating continued ICU stay, Anticipate >48hours inpatient - ICU LOS.     Daniel Redding MD   IM resident  PGY 3

## 2024-07-01 NOTE — HOSPITAL COURSE
Jim Forte is a 53 y.o. with a Medical History of essential hypertension, HLD, Type 1 DM, diabetic neuropathy, CAD s/p stent placement , GERD and depression who presented to [place] ED on account of AMS, unresponsiveness, received x2 narcan, became agitated and was intubated for airway protection. Admitted to [facility] ICU for severe metabolic encephalopathy s/p intubation and for DKA management. Cause of DKA thought to be due to insulin non compliance / dehydration from outdoor manual work.   On arrival to [facility] ICU, pt continued with DKA protocol on insulin drip and was given IV fluids boluses as needed. He had imaging including no acute process on CXR, negative RUQ US. CXR head no acute process. Abx switched from iv vanc / zosyn to just IV vanc pending blood cultures. He improved on DKA protocol and was extubated after following commands. Pt was able to start eating the night after extubation and was restarted on Lantus. Endocrinology on board and recommended switching Lispro with meals to 10 units TID. He reports he likely missed his insulin doses.   Pt able to ambulate with RN. He denies IV drug use, all abx stopped due to low suspicion for bloodstream infection.   Discussed with pt, he states he stopped aspirin for his CAD / stents because he believes it contributed to his diabetes and would not be open to restarting it.   Social work on board to assist with insurance and disability info.   He will be discharged with a recommendation to follow up with his PCP as well as an endocrinologist close to his home.   On discharge, will also hold his home lisinopril 10mg daily due to normotensive BP; consider restarting with PCP.   Pt should follow up with with his PCP and/or psychiatry for his depression which may be contributing to missing insulin doses.   He agrees to follow up with his PCP including for these issues.   Recommended pt follow up with endocrinology near his home for diabetes management.

## 2024-07-01 NOTE — PROGRESS NOTES
Jim Forte is a 53 y.o. male on day 0 of admission presenting with DKA, type 1, not at goal (Multi).    Subjective   No Bms noted overnight.   In the morning with pt woken up while on sedation, pt was agitated and tried to sit up multiple times.   Later in the morning, he is following commands doing a thumbs up and wiggling his toes.   It was determined he was ready for extubation. He was extubated around noon and placed on nasal cannula.     Talked with pt's friend over the phone in the morning. She said pt complained of R abd pain and feeling sick and she called EMS when she arrived at his house. She said pt was working in the heat doing electric work and may have missed his insulin at least one day when she saw him.   Talked with pt's mother over the phone at around noon. She said pt has hx of DKA from not using long acting insulin.          Objective     Physical Exam  Vitals reviewed.   Constitutional:       Comments: Agitated when woken form sedation with light touch while intubated   HENT:      Head: Normocephalic and atraumatic.   Eyes:      Extraocular Movements: Extraocular movements intact.      Conjunctiva/sclera: Conjunctivae normal.   Cardiovascular:      Rate and Rhythm: Normal rate and regular rhythm.      Heart sounds: No murmur heard.     No friction rub. No gallop.   Pulmonary:      Effort: Pulmonary effort is normal.      Breath sounds: Normal breath sounds. No wheezing, rhonchi or rales.   Abdominal:      General: Bowel sounds are normal.      Palpations: Abdomen is soft. There is no mass.      Tenderness: There is abdominal tenderness. There is no guarding or rebound.      Comments: Mild generalized abd ttp. No rebound or guarding      Musculoskeletal:         General: No tenderness.      Cervical back: Neck supple.      Right lower leg: No edema.      Left lower leg: No edema.   Skin:     General: Skin is warm and dry.      Findings: No bruising or rash.   Neurological:      Mental Status:  He is alert.      Comments: Answering questions, following commands. Moving all extremities.   Agitated when woken up off sedation            Last Recorded Vitals  Blood pressure 90/55, pulse 80, temperature 36.6 °C (97.9 °F), temperature source Temporal, resp. rate 22, height 1.829 m (6'), weight 71.8 kg (158 lb 4.6 oz), SpO2 100%.  Intake/Output last 3 Shifts:  I/O last 3 completed shifts:  In: 2205 (30.7 mL/kg) [I.V.:2005 (27.9 mL/kg); IV Piggyback:200]  Out: 950 (13.2 mL/kg) [Urine:950 (0.4 mL/kg/hr)]  Weight: 71.8 kg     Relevant Results           This patient currently has cardiac telemetry ordered; if you would like to modify or discontinue the telemetry order, click here to go to the orders activity to modify/discontinue the order.    Scheduled medications  heparin (porcine), 5,000 Units, subcutaneous, q8h KOMAL  pantoprazole, 40 mg, intravenous, Daily before breakfast  polyethylene glycol, 17 g, oral, Daily  [START ON 7/2/2024] vancomycin, 1,000 mg, intravenous, q24h      Continuous medications  dextrose 10 % in water (D10W), 150 mL/hr  dextrose 10 % in water (D10W), 150 mL/hr  dextrose 5%-0.45 % sodium chloride, 200 mL/hr, Last Rate: 200 mL/hr (07/01/24 1221)  insulin regular infusion for cardiac surgery, 1.5 Units/hr, Last Rate: 1.5 Units/hr (07/01/24 1219)      PRN medications  PRN medications: alteplase, oxygen, vancomycin  ECG 12 lead    Result Date: 7/1/2024  Normal sinus rhythm Low voltage QRS Prolonged QT Abnormal ECG When compared with ECG of 30-JUN-2024 22:43, (unconfirmed) Previous ECG has undetermined rhythm, needs review Incomplete right bundle branch block is no longer Present     right upper quadrant    Result Date: 7/1/2024  Interpreted By:  Preet Casas, STUDY:  RIGHT UPPER QUADRANT; 7/1/2024 8:07 am   INDICATION: Signs/Symptoms:R abd pain.   COMPARISON: None.   ACCESSION NUMBER(S): [id number]   ORDERING CLINICIAN: LEANNE SANDERSON   TECHNIQUE: Sonography of the right upper quadrant   was performed.   FINDINGS: Limited examination from suboptimal positioning due to patient's clinical status.   Pancreas: Obscured by shadowing bowel gas.   Liver: *Size: 19 cm, enlarged. *Echotexture: Normal, homogeneous. *Echogenicity: Normal. *Surface contour: Smooth. *Lesions: None.   Biliary: No intrahepatic biliary duct dilation.   CBD: 3 mm at the hilum, normal.   Gallbladder: *Caliber: Non-dilated. *Stones: None. *Sludge: None. *Wall thickening: Negative. *Sonographic Henriquez sign: Negative.   Right Kidney: No hydronephrosis.   Ascites: None.       No cholelithiasis or sonographic evidence of acute cholecystitis.   Hepatomegaly.   Obscured pancreas visualization.   MACRO: None.   Signed by: Preet Casas 7/1/2024 8:49 AM Dictation workstation:   [id number]    ECG 12 lead    Result Date: 7/1/2024  Undetermined rhythm Left axis deviation Incomplete right bundle branch block Nonspecific T wave abnormality Abnormal ECG No previous ECGs available    XR femur right 1 view    Result Date: 7/1/2024  Interpreted By:  Lucy Castro, STUDY: XR FEMUR RIGHT 1 VIEW; ;  7/1/2024 4:26 am   INDICATION: Signs/Symptoms:placement of right femoral CVC.   COMPARISON: None.   ACCESSION NUMBER(S): [id number]   ORDERING CLINICIAN: LEANNE SANDERSON   FINDINGS: Single AP view right femur. There is a vascular catheter overlying the right groin and right shazia sacrum. The tip of the catheter is not imaged.   No acute fracture or malalignment. Mild right hip osteoarthrosis.       The tip of the right femoral vascular catheter is not imaged. Consider further evaluation with pelvis x-ray if tip placement confirmation is desired.     MACRO: None   Signed by: Lucy Castro 7/1/2024 4:40 AM Dictation workstation:   [id number]    XR chest 1 view    Result Date: 7/1/2024  Interpreted By:  Lucy Castro, STUDY: XR CHEST 1 VIEW;  7/1/2024 4:26 am   INDICATION: Signs/Symptoms:OGT placement.   COMPARISON: 07/01/2024 at 1:06 a.m.   ACCESSION  NUMBER(S): [id number]   ORDERING CLINICIAN: LEANNE SANDERSON   FINDINGS: The endotracheal tube terminates approximately 3.9 cm above the ada. The nasogastric tube courses below the diaphragm with tip in the region of the gastric body.   CARDIOMEDIASTINAL SILHOUETTE: Cardiomediastinal silhouette is normal in size and configuration.   LUNGS: No pulmonary consolidation, pleural effusion or pneumothorax.   ABDOMEN: No remarkable upper abdominal findings.   BONES: No acute osseous abnormality.       No acute cardiopulmonary process. Tubes and lines as above.   MACRO: None   Signed by: Lucy Castro 7/1/2024 4:39 AM Dictation workstation:   [id number]    CT head wo IV contrast    Result Date: 7/1/2024  Interpreted By:  Lucy Castro, STUDY: CT HEAD WO IV CONTRAST;  7/1/2024 2:49 am   INDICATION: Signs/Symptoms:Altered Mental Status.   COMPARISON: None.   ACCESSION NUMBER(S): [id number]   ORDERING CLINICIAN: CARL BENAVIDES   TECHNIQUE: Axial noncontrast CT images of the head.   FINDINGS: BRAIN PARENCHYMA:  Gray-white matter interfaces are preserved. No mass effect or midline shift.   HEMORRHAGE: No acute intracranial hemorrhage. VENTRICLES and EXTRA-AXIAL SPACES: The ventricles and sulci are within normal limits in size for brain volume. No abnormal extraaxial fluid collection. EXTRACRANIAL SOFT TISSUES: Within normal limits. PARANASAL SINUSES/MASTOIDS:  The visualized paranasal sinuses and mastoid air cells are aerated. CALVARIUM: No depressed skull fracture. No destructive osseous lesion.   OTHER FINDINGS: None.       No acute intracranial abnormality.     MACRO: None   Signed by: Lucy Castro 7/1/2024 3:27 AM Dictation workstation:   [id number]    XR chest 1 view    Result Date: 7/1/2024  Interpreted By:  Ivan Kahn, STUDY: XR CHEST 1 VIEW;  7/1/2024 1:37 am   INDICATION: Signs/Symptoms:post intubation.   COMPARISON: None.   ACCESSION NUMBER(S): [id number]   ORDERING CLINICIAN: CARL BENAVIDES    FINDINGS: - ETT termination: 6.9 cm above ada.   The cardiomediastinal silhouette and pulmonary vasculature are within normal limits. No consolidation, pleural effusion or pneumothorax.         No acute radiographic abnormality of the chest.     MACRO: None.   Signed by: Ivan Kahn 7/1/2024 2:14 AM Dictation workstation:   [id number]   Results for orders placed or performed during the hospital encounter of 07/01/24 (from the past 24 hour(s))   POCT GLUCOSE   Result Value Ref Range    POCT Glucose >600 (H) 74 - 99 mg/dL   Magnesium   Result Value Ref Range    Magnesium 2.27 1.60 - 2.40 mg/dL   Phosphorus   Result Value Ref Range    Phosphorus 5.5 (H) 2.5 - 4.9 mg/dL   Troponin I, High Sensitivity   Result Value Ref Range    Troponin I, High Sensitivity 14 0 - 20 ng/L   Lactate   Result Value Ref Range    Lactate 5.5 (HH) 0.4 - 2.0 mmol/L   Comprehensive metabolic panel   Result Value Ref Range    Glucose 763 (HH) 74 - 99 mg/dL    Sodium 133 (L) 136 - 145 mmol/L    Potassium 4.5 3.5 - 5.3 mmol/L    Chloride 98 98 - 107 mmol/L    Bicarbonate 6 (LL) 21 - 32 mmol/L    Anion Gap 34 (H) 10 - 20 mmol/L    Urea Nitrogen 30 (H) 6 - 23 mg/dL    Creatinine 1.94 (H) 0.50 - 1.30 mg/dL    eGFR 41 (L) >60 mL/min/1.73m*2    Calcium 7.8 (L) 8.6 - 10.3 mg/dL    Albumin 3.5 3.4 - 5.0 g/dL    Alkaline Phosphatase 205 (H) 33 - 120 U/L    Total Protein 6.0 (L) 6.4 - 8.2 g/dL    AST 26 9 - 39 U/L    Bilirubin, Total 0.3 0.0 - 1.2 mg/dL    ALT 75 (H) 10 - 52 U/L   CBC   Result Value Ref Range    WBC 24.2 (H) 4.4 - 11.3 x10*3/uL    nRBC 0.0 0.0 - 0.0 /100 WBCs    RBC 4.28 (L) 4.50 - 5.90 x10*6/uL    Hemoglobin 13.2 (L) 13.5 - 17.5 g/dL    Hematocrit 42.2 41.0 - 52.0 %    MCV 99 80 - 100 fL    MCH 30.8 26.0 - 34.0 pg    MCHC 31.3 (L) 32.0 - 36.0 g/dL    RDW 12.7 11.5 - 14.5 %    Platelets 333 150 - 450 x10*3/uL   Beta Hydroxybutyrate   Result Value Ref Range    Beta-Hydroxybutyrate 8.81 (H) 0.02 - 0.27 mmol/L   Creatine Kinase    Result Value Ref Range    Creatine Kinase 99 0 - 325 U/L   B-type natriuretic peptide   Result Value Ref Range     (H) 0 - 99 pg/mL   Sars-CoV-2 PCR   Result Value Ref Range    Coronavirus 2019, PCR Not Detected Not Detected   Blood Gas Arterial Full Panel   Result Value Ref Range    POCT pH, Arterial 7.14 (LL) 7.38 - 7.42 pH    POCT pCO2, Arterial 16 (L) 38 - 42 mm Hg    POCT pO2, Arterial 126 (H) 85 - 95 mm Hg    POCT SO2, Arterial 100 94 - 100 %    POCT Oxy Hemoglobin, Arterial 97.9 94.0 - 98.0 %    POCT Hematocrit Calculated, Arterial 41.0 41.0 - 52.0 %    POCT Sodium, Arterial 135 (L) 136 - 145 mmol/L    POCT Potassium, Arterial 4.7 3.5 - 5.3 mmol/L    POCT Chloride, Arterial 99 98 - 107 mmol/L    POCT Ionized Calcium, Arterial 1.19 1.10 - 1.33 mmol/L    POCT Glucose, Arterial >685 (HH) 74 - 99 mg/dL    POCT Lactate, Arterial 6.0 (HH) 0.4 - 2.0 mmol/L    POCT Base Excess, Arterial -21.4 (L) -2.0 - 3.0 mmol/L    POCT HCO3 Calculated, Arterial 5.4 (L) 22.0 - 26.0 mmol/L    POCT Hemoglobin, Arterial 13.6 13.5 - 17.5 g/dL    POCT Anion Gap, Arterial 35 (H) 10 - 25 mmo/L    Patient Temperature      FiO2 30 %   POCT GLUCOSE   Result Value Ref Range    POCT Glucose >600 (H) 74 - 99 mg/dL   Glucose, random   Result Value Ref Range    Glucose 549 (HH) 74 - 99 mg/dL   ECG 12 lead   Result Value Ref Range    Ventricular Rate 78 BPM    Atrial Rate 78 BPM    MI Interval 174 ms    QRS Duration 86 ms    QT Interval 438 ms    QTC Calculation(Bazett) 499 ms    P Axis -15 degrees    R Axis 40 degrees    T Axis 3 degrees    QRS Count 13 beats    Q Onset 225 ms    P Onset 138 ms    P Offset 191 ms    T Offset 444 ms    QTC Fredericia 478 ms   POCT GLUCOSE   Result Value Ref Range    POCT Glucose 452 (H) 74 - 99 mg/dL   Blood Gas Lactic Acid, Venous   Result Value Ref Range    POCT Lactate, Venous 6.0 (HH) 0.4 - 2.0 mmol/L   Lactate   Result Value Ref Range    Lactate 5.4 (HH) 0.4 - 2.0 mmol/L   POCT GLUCOSE   Result Value  Ref Range    POCT Glucose 285 (H) 74 - 99 mg/dL   Renal function panel   Result Value Ref Range    Glucose 292 (H) 74 - 99 mg/dL    Sodium 143 136 - 145 mmol/L    Potassium 3.9 3.5 - 5.3 mmol/L    Chloride 109 (H) 98 - 107 mmol/L    Bicarbonate 15 (L) 21 - 32 mmol/L    Anion Gap 23 (H) 10 - 20 mmol/L    Urea Nitrogen 28 (H) 6 - 23 mg/dL    Creatinine 1.65 (H) 0.50 - 1.30 mg/dL    eGFR 49 (L) >60 mL/min/1.73m*2    Calcium 7.6 (L) 8.6 - 10.3 mg/dL    Phosphorus 2.5 2.5 - 4.9 mg/dL    Albumin 3.2 (L) 3.4 - 5.0 g/dL   Amylase   Result Value Ref Range    Amylase 238 (H) 29 - 103 U/L   Lipase   Result Value Ref Range    Lipase 92 (H) 9 - 82 U/L   POCT GLUCOSE   Result Value Ref Range    POCT Glucose 255 (H) 74 - 99 mg/dL   POCT GLUCOSE   Result Value Ref Range    POCT Glucose 204 (H) 74 - 99 mg/dL   Blood Gas Venous Full Panel   Result Value Ref Range    POCT pH, Venous 7.35 7.33 - 7.43 pH    POCT pCO2, Venous 34 (L) 41 - 51 mm Hg    POCT pO2, Venous 43 35 - 45 mm Hg    POCT SO2, Venous 83 (H) 45 - 75 %    POCT Oxy Hemoglobin, Venous 82.1 (H) 45.0 - 75.0 %    POCT Hematocrit Calculated, Venous 38.0 (L) 41.0 - 52.0 %    POCT Sodium, Venous 142 136 - 145 mmol/L    POCT Potassium, Venous 4.1 3.5 - 5.3 mmol/L    POCT Chloride, Venous 112 (H) 98 - 107 mmol/L    POCT Ionized Calicum, Venous 1.11 1.10 - 1.33 mmol/L    POCT Glucose, Venous 170 (H) 74 - 99 mg/dL    POCT Lactate, Venous 3.7 (H) 0.4 - 2.0 mmol/L    POCT Base Excess, Venous -6.0 (L) -2.0 - 3.0 mmol/L    POCT HCO3 Calculated, Venous 18.8 (L) 22.0 - 26.0 mmol/L    POCT Hemoglobin, Venous 12.7 (L) 13.5 - 17.5 g/dL    POCT Anion Gap, Venous 15.0 10.0 - 25.0 mmol/L    Patient Temperature      FiO2 30 %   POCT GLUCOSE   Result Value Ref Range    POCT Glucose 177 (H) 74 - 99 mg/dL   POCT GLUCOSE   Result Value Ref Range    POCT Glucose 142 (H) 74 - 99 mg/dL   Basic metabolic panel   Result Value Ref Range    Glucose 145 (H) 74 - 99 mg/dL    Sodium 144 136 - 145 mmol/L     Potassium 4.1 3.5 - 5.3 mmol/L    Chloride 113 (H) 98 - 107 mmol/L    Bicarbonate 24 21 - 32 mmol/L    Anion Gap 11 10 - 20 mmol/L    Urea Nitrogen 24 (H) 6 - 23 mg/dL    Creatinine 1.32 (H) 0.50 - 1.30 mg/dL    eGFR 64 >60 mL/min/1.73m*2    Calcium 7.3 (L) 8.6 - 10.3 mg/dL   Lactate   Result Value Ref Range    Lactate 1.9 0.4 - 2.0 mmol/L   POCT GLUCOSE   Result Value Ref Range    POCT Glucose 163 (H) 74 - 99 mg/dL   POCT GLUCOSE   Result Value Ref Range    POCT Glucose 138 (H) 74 - 99 mg/dL              Assessment/Plan   Principal Problem:    DKA, type 1, not at goal (Multi)  Active Problems:    Unresponsive    53 year old man with a medical history of essential HTN, T2DM, neuropathy, CAD sp stent placement and GERD and depression. who initially presented to [facility] ED on account of AMS and unresponsiveness received x2 narcan, became agitated and was intubated for airway protection. BG too large to be detected, UA positive for ketones, patient transferred to the ICU for DKA mgt.      Neurology:   #Acute metabolic encephalopathy  :: multifactorial: DKA vs ?OD vs Sepsis  -CT head: no acute intracranial process. Sp narcan x2 in [facility] ed and ativan.   -Hold gabapentin for now, consider restarting if needed   -Extubated 7/1 around noon placed on NC.   -consider PO zyprexa or IV Haldol if needed for any agitation.      #depression - per mother, pt has hx depression and hx of prior DKA from not taking insulin due to depression. Likely will need psych follow up outpatient.      #hx recreational substance use   #amphetamine positive on drug screen   -hx crack cocaine use per prior charts. Concern for IVDU; continue iv vanc for now  -consider outpatient intensive rehab on discharge if pt agreeable     Cardiovascular:   #CAD s/p stent - no aspirin noted. EKG and trops unremarkable. Follow up with PCP / cardiology for management      #essential hypertension;   -hold lisinopril in the setting of Adan; consider restarting  when ready      # HLD  CAD s/p stent placement   -restart atorvastatin 40mg daily when LFTs are better. Pt likely needs PCP / cardiology follow up; unclear why he is not on aspirin.      Respiratory:   DRU     Endocrinology:   #DKA in T1DM   -History of T1DM and neuropathy, last A1c 13.3 (3/24), noncompliance. Per prior endocrinology note 11/2023, he has T1DM and hx of DKA due to insulin noncompliance   -anion gap closed. continue DKA protocol while pt not able to eat. Consider starting lantus at 9 pm 7/1 when pt able to eat.     Gastrointestinal:  #Transaminitits -likely dt severe dehydration in the setting of DKA vs hyperglycemia. ,   -follow lfts, restart statin when improved.      # GERD - Not on any medication     Heme Onc:  #leukocytosis - infections vs reactive  -Continue vancomycin. Stop Zosyn 7/1, RUQ negative, unlikely pt has abdominal process.   -Blood culture pending   -Urine culture pending      Infectious Disease:   # concern for sepsis   -SIRS tachycardia ( bpm), tachypnea RR 24, wbc 13, lactic acidosis, unclear source of infection. S/p vanc and zosyn in the ed  [ ] blood culture  [ ] urine culture  -stop zosyn ( 7/1/24), Continue vancomycin (7/1/24-) due to concern for IV drug use. Follow cultures, discontinue vanc if negative or if pt denies IV drug use.      Genitourinary:   # MIKE on CKD, resolved   :: likely due to dehydration   -Scr 1.86 on admission (Baseline scr 1.58)  --Hold nephrotoxic medications      # Electrolyte derangement due to DKA, resolved   #Hyponatremia, resolved   #Hyperkalemia, resolved      Musculoskeletal:   DRU     F: PRN bolus, DKA fluids   E: Replete as needed  N: NPO, start diet when ready   G: None  VTE: heparin subcutaneous q8hrs  Antibiotics: IV vancomycin (7/1/24-)  Lines/Tejeda: 2PIV, Rt femoral line remove 7/1, tejeda present (7/1/2024)     Code:   Full Code     Disposition: Admitted to the ICU for DKA, intubated for airway protection. The patient  is at risk for immediate hemodynamic collapse necessitating continued ICU stay; monitor in ICU overnight s/p extubation.              Kwasi Wells, DO

## 2024-07-01 NOTE — ED TRIAGE NOTES
Pt arrived unresponsive. Dr Liriano called to bedside and vebal order for narcan 2 doses given and pt responsive after 2nd dose. Agitated and verbal order for ativan given

## 2024-07-02 VITALS
HEIGHT: 72 IN | HEART RATE: 69 BPM | OXYGEN SATURATION: 100 % | SYSTOLIC BLOOD PRESSURE: 133 MMHG | WEIGHT: 158.29 LBS | DIASTOLIC BLOOD PRESSURE: 74 MMHG | BODY MASS INDEX: 21.44 KG/M2 | RESPIRATION RATE: 18 BRPM | TEMPERATURE: 98.6 F

## 2024-07-02 PROBLEM — E78.5 HYPERLIPIDEMIA WITH TARGET LDL LESS THAN 70: Status: ACTIVE | Noted: 2018-06-26

## 2024-07-02 PROBLEM — F32.0 CURRENT MILD EPISODE OF MAJOR DEPRESSIVE DISORDER WITHOUT PRIOR EPISODE (CMS-HCC): Status: ACTIVE | Noted: 2023-11-14

## 2024-07-02 PROBLEM — R41.89 UNRESPONSIVE: Status: RESOLVED | Noted: 2024-07-01 | Resolved: 2024-07-02

## 2024-07-02 PROBLEM — K21.9 GASTROESOPHAGEAL REFLUX DISEASE: Status: ACTIVE | Noted: 2021-05-25

## 2024-07-02 PROBLEM — G43.909 MIGRAINE: Status: ACTIVE | Noted: 2024-06-12

## 2024-07-02 PROBLEM — Z95.5 S/P PRIMARY ANGIOPLASTY WITH CORONARY STENT: Status: ACTIVE | Noted: 2021-05-25

## 2024-07-02 LAB
ALBUMIN SERPL BCP-MCNC: 2.7 G/DL (ref 3.4–5)
ALP SERPL-CCNC: 137 U/L (ref 33–120)
ALT SERPL W P-5'-P-CCNC: 45 U/L (ref 10–52)
ANION GAP SERPL CALC-SCNC: <7 MMOL/L (ref 10–20)
AST SERPL W P-5'-P-CCNC: 18 U/L (ref 9–39)
BACTERIA UR CULT: NO GROWTH
BILIRUB SERPL-MCNC: 0.2 MG/DL (ref 0–1.2)
BUN SERPL-MCNC: 17 MG/DL (ref 6–23)
CALCIUM SERPL-MCNC: 7.4 MG/DL (ref 8.6–10.3)
CHLORIDE SERPL-SCNC: 112 MMOL/L (ref 98–107)
CO2 SERPL-SCNC: 26 MMOL/L (ref 21–32)
CREAT SERPL-MCNC: 0.98 MG/DL (ref 0.5–1.3)
EGFRCR SERPLBLD CKD-EPI 2021: >90 ML/MIN/1.73M*2
ERYTHROCYTE [DISTWIDTH] IN BLOOD BY AUTOMATED COUNT: 13.1 % (ref 11.5–14.5)
GLUCOSE BLD MANUAL STRIP-MCNC: 108 MG/DL (ref 74–99)
GLUCOSE BLD MANUAL STRIP-MCNC: 144 MG/DL (ref 74–99)
GLUCOSE BLD MANUAL STRIP-MCNC: 204 MG/DL (ref 74–99)
GLUCOSE BLD MANUAL STRIP-MCNC: 272 MG/DL (ref 74–99)
GLUCOSE BLD MANUAL STRIP-MCNC: 80 MG/DL (ref 74–99)
GLUCOSE SERPL-MCNC: 99 MG/DL (ref 74–99)
HCT VFR BLD AUTO: 31.9 % (ref 41–52)
HGB BLD-MCNC: 11.1 G/DL (ref 13.5–17.5)
MAGNESIUM SERPL-MCNC: 1.92 MG/DL (ref 1.6–2.4)
MCH RBC QN AUTO: 31.4 PG (ref 26–34)
MCHC RBC AUTO-ENTMCNC: 34.8 G/DL (ref 32–36)
MCV RBC AUTO: 90 FL (ref 80–100)
NRBC BLD-RTO: 0 /100 WBCS (ref 0–0)
PHOSPHATE SERPL-MCNC: 1.6 MG/DL (ref 2.5–4.9)
PLATELET # BLD AUTO: 219 X10*3/UL (ref 150–450)
POTASSIUM SERPL-SCNC: 3.3 MMOL/L (ref 3.5–5.3)
PROT SERPL-MCNC: 4.5 G/DL (ref 6.4–8.2)
RBC # BLD AUTO: 3.53 X10*6/UL (ref 4.5–5.9)
SODIUM SERPL-SCNC: 141 MMOL/L (ref 136–145)
STAPHYLOCOCCUS SPEC CULT: ABNORMAL
VANCOMYCIN SERPL-MCNC: 14.1 UG/ML (ref 5–20)
WBC # BLD AUTO: 12.6 X10*3/UL (ref 4.4–11.3)

## 2024-07-02 PROCEDURE — 84100 ASSAY OF PHOSPHORUS: CPT

## 2024-07-02 PROCEDURE — 84075 ASSAY ALKALINE PHOSPHATASE: CPT

## 2024-07-02 PROCEDURE — 2500000002 HC RX 250 W HCPCS SELF ADMINISTERED DRUGS (ALT 637 FOR MEDICARE OP, ALT 636 FOR OP/ED)

## 2024-07-02 PROCEDURE — 80202 ASSAY OF VANCOMYCIN: CPT

## 2024-07-02 PROCEDURE — 2500000001 HC RX 250 WO HCPCS SELF ADMINISTERED DRUGS (ALT 637 FOR MEDICARE OP)

## 2024-07-02 PROCEDURE — 85027 COMPLETE CBC AUTOMATED: CPT

## 2024-07-02 PROCEDURE — 83735 ASSAY OF MAGNESIUM: CPT

## 2024-07-02 PROCEDURE — 36415 COLL VENOUS BLD VENIPUNCTURE: CPT

## 2024-07-02 PROCEDURE — 99239 HOSP IP/OBS DSCHRG MGMT >30: CPT | Performed by: INTERNAL MEDICINE

## 2024-07-02 PROCEDURE — 2500000004 HC RX 250 GENERAL PHARMACY W/ HCPCS (ALT 636 FOR OP/ED)

## 2024-07-02 PROCEDURE — 82947 ASSAY GLUCOSE BLOOD QUANT: CPT

## 2024-07-02 PROCEDURE — 99222 1ST HOSP IP/OBS MODERATE 55: CPT | Performed by: INTERNAL MEDICINE

## 2024-07-02 PROCEDURE — C9113 INJ PANTOPRAZOLE SODIUM, VIA: HCPCS

## 2024-07-02 RX ORDER — INSULIN GLARGINE 100 [IU]/ML
36 INJECTION, SOLUTION SUBCUTANEOUS NIGHTLY
Status: DISCONTINUED | OUTPATIENT
Start: 2024-07-02 | End: 2024-07-02 | Stop reason: HOSPADM

## 2024-07-02 RX ORDER — POTASSIUM CHLORIDE 20 MEQ/1
40 TABLET, EXTENDED RELEASE ORAL ONCE
Status: COMPLETED | OUTPATIENT
Start: 2024-07-02 | End: 2024-07-02

## 2024-07-02 RX ORDER — AMOXICILLIN 250 MG
1 CAPSULE ORAL NIGHTLY
Status: DISCONTINUED | OUTPATIENT
Start: 2024-07-02 | End: 2024-07-02 | Stop reason: HOSPADM

## 2024-07-02 RX ORDER — INSULIN GLARGINE 300 [IU]/ML
36 INJECTION, SOLUTION SUBCUTANEOUS DAILY
Start: 2024-07-02 | End: 2024-07-02

## 2024-07-02 RX ORDER — INSULIN LISPRO 100 [IU]/ML
5 INJECTION, SOLUTION INTRAVENOUS; SUBCUTANEOUS ONCE
Status: COMPLETED | OUTPATIENT
Start: 2024-07-02 | End: 2024-07-02

## 2024-07-02 RX ORDER — POTASSIUM CHLORIDE 14.9 MG/ML
20 INJECTION INTRAVENOUS ONCE
Status: COMPLETED | OUTPATIENT
Start: 2024-07-02 | End: 2024-07-02

## 2024-07-02 RX ORDER — VANCOMYCIN HYDROCHLORIDE 1 G/200ML
1000 INJECTION, SOLUTION INTRAVENOUS EVERY 12 HOURS
Status: DISCONTINUED | OUTPATIENT
Start: 2024-07-02 | End: 2024-07-02

## 2024-07-02 RX ORDER — INSULIN LISPRO 100 [IU]/ML
10 INJECTION, SOLUTION INTRAVENOUS; SUBCUTANEOUS
Start: 2024-07-02

## 2024-07-02 RX ORDER — INSULIN GLARGINE 300 [IU]/ML
45 INJECTION, SOLUTION SUBCUTANEOUS DAILY
Start: 2024-07-02

## 2024-07-02 RX ORDER — POTASSIUM CHLORIDE 1.5 G/1.58G
20 POWDER, FOR SOLUTION ORAL ONCE
Status: COMPLETED | OUTPATIENT
Start: 2024-07-02 | End: 2024-07-02

## 2024-07-02 ASSESSMENT — ENCOUNTER SYMPTOMS
ENDOCRINE COMMENTS: AS ABOVE
NAUSEA: 0
SHORTNESS OF BREATH: 0
VOMITING: 0
UNEXPECTED WEIGHT CHANGE: 0

## 2024-07-02 ASSESSMENT — PAIN - FUNCTIONAL ASSESSMENT
PAIN_FUNCTIONAL_ASSESSMENT: 0-10

## 2024-07-02 ASSESSMENT — PAIN SCALES - GENERAL
PAINLEVEL_OUTOF10: 0 - NO PAIN

## 2024-07-02 NOTE — PROGRESS NOTES
07/02/24 1500   Discharge Planning   Patient expects to be discharged to: Patient to [place] today. Patient needs a ride to [place]. Call placed to patient's friend who states if ride can be provided to patient from [facility] to [place] she will then transport patient to [place]. Because patient was transferred from [facility] to [facility] the hospital will provide transport back to [place]. [facility] wil[facility] van requested for 3:30 PM but ride has not yet been confirmed.   Does the patient need discharge transport arranged? Yes   RoundTrip coordination needed? Yes   Has discharge transport been arranged? Yes   What day is the transport expected? 07/02/24

## 2024-07-02 NOTE — DISCHARGE SUMMARY
Discharge Diagnosis  DKA, type 1, not at goal (Multi)    Issues Requiring Follow-Up  Follow up with PCP for a reevaluation, and for HTN and depression management.   Follow up with endocrinology for diabetes management.     Test Results Pending At Discharge  Pending Labs       No current pending labs.            Hospital Course  Jim Forte is a 53 y.o. with a Medical History of essential hypertension, HLD, Type 1 DM, diabetic neuropathy, CAD s/p stent placement , GERD and depression who presented to [facility] ED on account of AMS, unresponsiveness, received x2 narcan, became agitated and was intubated for airway protection. Admitted to [facility] ICU for severe metabolic encephalopathy s/p intubation and for DKA management. Cause of DKA thought to be due to insulin non compliance / dehydration from outdoor manual work.   On arrival to [facility] ICU, pt continued with DKA protocol on insulin drip and was given IV fluids boluses as needed. He had imaging including no acute process on CXR, negative RUQ US. CXR head no acute process. Abx switched from iv vanc / zosyn to just IV vanc pending blood cultures. He improved on DKA protocol and was extubated after following commands. Pt was able to start eating the night after extubation and was restarted on Lantus. Endocrinology on board and recommended switching Lispro with meals to 10 units TID. He reports he likely missed his insulin doses.   Pt able to ambulate with RN. He denies IV drug use, all abx stopped due to low suspicion for bloodstream infection.   Discussed with pt, he states he stopped aspirin for his CAD / stents because he believes it contributed to his diabetes and would not be open to restarting it.   Social work on board to assist with insurance and disability info.   He will be discharged with a recommendation to follow up with his PCP as well as an endocrinologist close to his home.   On discharge, will also hold his home lisinopril 10mg daily due to  normotensive BP; consider restarting with PCP.   Pt should follow up with with his PCP and/or psychiatry for his depression which may be contributing to missing insulin doses.   He agrees to follow up with his PCP including for these issues.   Recommended pt follow up with endocrinology near his home for diabetes management.     Pertinent Physical Exam At Time of Discharge  Physical Exam  Vitals reviewed.   Constitutional:       General: He is not in acute distress.  HENT:      Head: Normocephalic and atraumatic.   Eyes:      Extraocular Movements: Extraocular movements intact.      Conjunctiva/sclera: Conjunctivae normal.   Cardiovascular:      Rate and Rhythm: Normal rate and regular rhythm.      Heart sounds: No murmur heard.     No friction rub. No gallop.   Pulmonary:      Effort: Pulmonary effort is normal.      Breath sounds: Normal breath sounds. No wheezing, rhonchi or rales.   Abdominal:      General: Bowel sounds are normal.      Palpations: Abdomen is soft. There is no mass.      Tenderness: There is no abdominal tenderness. There is no guarding or rebound.   Musculoskeletal:         General: No tenderness.      Cervical back: Neck supple.      Right lower leg: No edema.      Left lower leg: No edema.   Skin:     General: Skin is warm and dry.      Findings: No bruising or rash.   Neurological:      Mental Status: He is alert. Mental status is at baseline.      Comments: Answering questions, following commands. Moving all extremities.    Psychiatric:         Mood and Affect: Mood and affect normal.         Home Medications     Medication List      CHANGE how you take these medications     insulin lispro 100 unit/mL injection; Commonly known as: HumaLOG; Inject   10 Units under the skin 3 times a day before meals.; What changed: how   much to take; Notes to patient: WITH MEALS     CONTINUE taking these medications     atorvastatin 40 mg tablet; Commonly known as: Lipitor   Blood Glucose Test strip;  Generic drug: blood sugar diagnostic   Dexcom G7  misc; Generic drug: blood-glucose meter,continuous   insulin glargine 300 unit/mL (1.5 mL) injection; Commonly known as:   Toujeo Solostar- 1 unit dial; Inject 45 Units under the skin once daily.   pregabalin 150 mg capsule; Commonly known as: Lyrica   SUMAtriptan 100 mg tablet; Commonly known as: Imitrex     STOP taking these medications     lisinopril 10 mg tablet       Outpatient Follow-Up  No future appointments.    Kwasi Wells, DO

## 2024-07-02 NOTE — CONSULTS
"Inpatient consult to Endocrinology  Consult performed by: Darinel Gurrola MD  Consult ordered by: Mata Bolton MD      Reason For Consult  Diabetes    History Of Present Illness  Jim Forte is a 53 y.o. male     Duration of type 2 diabetes mellitus:  2 years  Complications:  peripheral neuropathy    Prior [facility] for D[facility] [place]  He has had other episodes of DKA  Despite this history he was told he has type 2 diabetes, previously considered \"type 1.5.\"\"    He is visiting from [place], has his insulin but is still not taking it    Home regimen  Toujeo 45 units at bedtime  Humalog 15 units QA    DexCom G7, not currently on    Last A1c 13.3% (3/13/24)    Medications    Current Facility-Administered Medications:     dextrose 50 % injection 12.5 g, 12.5 g, intravenous, q15 min PRN, Kwasi Wells DO    dextrose 50 % injection 25 g, 25 g, intravenous, q15 min PRN, Kwasi Wells DO    glucagon (Glucagen) injection 1 mg, 1 mg, intramuscular, q15 min PRN, Kwasi Wells DO    glucagon (Glucagen) injection 1 mg, 1 mg, intramuscular, q15 min PRN, Kwasi Wells DO    heparin (porcine) injection 5,000 Units, 5,000 Units, subcutaneous, q8h KOMAL, Daniel Redding MD, 5,000 Units at 07/02/24 0605    insulin glargine (Lantus) injection 45 Units, 45 Units, subcutaneous, Nightly, Kwasi Wells DO, 45 Units at 07/01/24 2122    insulin lispro (HumaLOG) injection 0-5 Units, 0-5 Units, subcutaneous, q4h, Kwasi Wells DO, 2 Units at 07/02/24 1250    oxygen (O2) therapy, , inhalation, Continuous PRN - O2/gases, Mata Bolton MD, 2 L/min at 07/01/24 1019    polyethylene glycol (Glycolax, Miralax) packet 17 g, 17 g, oral, Daily, Daniel Redding MD    sennosides-docusate sodium (Katie-Colace) 8.6-50 mg per tablet 1 tablet, 1 tablet, oral, Nightly, Kwasi Wells DO    sod phos di, mono-K phos mono (K Phos Neutral) tablet 250 mg, 250 mg, oral, 4x daily, Kwasi Wells DO, 250 mg at 07/02/24 0854     Past " Medical History  He has a past medical history of Diabetes mellitus (Multi) and MI (myocardial infarction) (Multi).    Surgical History  He has no past surgical history on file.     Social History  He reports that he has been smoking cigarettes. He has never used smokeless tobacco. No history on file for alcohol use and drug use.    Family History  No family history on file.    Allergies  Patient has no known allergies.    Review of Systems   Constitutional:  Negative for unexpected weight change.   HENT:          Dry mouth   Eyes:  Negative for visual disturbance.   Respiratory:  Negative for shortness of breath.    Cardiovascular:  Negative for chest pain.   Gastrointestinal:  Negative for nausea and vomiting.   Endocrine:        As above         Last Recorded Vitals  Blood pressure 107/68, pulse 78, temperature 37.1 °C (98.8 °F), temperature source Temporal, resp. rate 19, height 1.829 m (6'), weight 71.8 kg (158 lb 4.6 oz), SpO2 100%.    Physical Exam  Constitutional:       General: He is not in acute distress.  HENT:      Head: Normocephalic.      Mouth/Throat:      Mouth: Mucous membranes are moist.   Eyes:      Extraocular Movements: Extraocular movements intact.   Neck:      Thyroid: No thyroid mass or thyromegaly.   Musculoskeletal:      Right lower leg: No edema.      Left lower leg: No edema.   Lymphadenopathy:      Cervical: No cervical adenopathy.   Neurological:      Mental Status: He is alert.      Motor: No tremor.   Psychiatric:         Mood and Affect: Affect is flat.          Relevant Results  Lab Results   Component Value Date    POCGLU 204 (H) 07/02/2024    POCGLU 80 07/02/2024    POCGLU 108 (H) 07/02/2024    POCGLU 144 (H) 07/02/2024    POCGLU 151 (H) 07/01/2024    GLUCOSE 99 07/02/2024    GLUCOSE 139 (H) 07/01/2024    GLUCOSE 155 (H) 07/01/2024    GLUCOSE 145 (H) 07/01/2024    GLUCOSE 292 (H) 07/01/2024      Latest Reference Range & Units 07/01/24 08:13 07/01/24 12:20   GLUCOSE 74 - 99 mg/dL  292 (H) 145 (H)   SODIUM 136 - 145 mmol/L 143 144   POTASSIUM 3.5 - 5.3 mmol/L 3.9 4.1   CHLORIDE 98 - 107 mmol/L 109 (H) 113 (H)   Bicarbonate 21 - 32 mmol/L 15 (L) 24   Anion Gap 10 - 20 mmol/L 23 (H) 11   Blood Urea Nitrogen 6 - 23 mg/dL 28 (H) 24 (H)   Creatinine 0.50 - 1.30 mg/dL 1.65 (H) 1.32 (H)   EGFR >60 mL/min/1.73m*2 49 (L) 64   Calcium 8.6 - 10.3 mg/dL 7.6 (L) 7.3 (L)   PHOSPHORUS 2.5 - 4.9 mg/dL 2.5    Albumin 3.4 - 5.0 g/dL 3.2 (L)       [facility] Reference Range & Units 07/01/24 04:17   GLUCOSE 74 - 99 mg/dL 763 (HH)   SODIUM 136 - 145 mmol/L 133 (L)   POTASSIUM 3.5 - 5.3 mmol/L 4.5   CHLORIDE 98 - 107 mmol/L 98   Bicarbonate 21 - 32 mmol/L 6 (LL)   Anion Gap 10 - 20 mmol/L 34 (H)   Blood Urea Nitrogen 6 - 23 mg/dL 30 (H)   Creatinine 0.50 - 1.30 mg/dL 1.94 (H)   EGFR >60 mL/min/1.73m*2 41 (L)   Calcium 8.6 - 10.3 mg/dL 7.8 (L)   PHOSPHORUS 2.5 - 4.9 mg/dL 5.5 (H)   Albumin 3.4 - 5.0 g/dL 3.5   Alkaline Phosphatase 33 - 120 U/L 205 (H)   ALT 10 - 52 U/L 75 (H)   AST 9 - 39 U/L 26   Bilirubin Total 0.0 - 1.2 mg/dL 0.3   Creatine Kinase 0 - 325 U/L 99     IMPRESSION  DIABETIC KETOACIDOSIS, RESOLVED  TYPE 2 DIABETES MELLITUS WITH HYPERGLYCEMIA  LONG TERM CURRENT INSULIN USE  Presumed history of type 2 diabetes but frequency of DKA favors insulin deficiency, possible type 1 diabetes.   DKA due to insulin omission  Glucose tight this morning following insulin glargine U100 at 45 units  Insulin glargine U300 45 units is equivalent to 36 units of U100      RECOMMENDATIONS  Insulin glargine U100, 36 units tonight  Start lispro 10 units QAC plus scale  Discussed with house staff        Darinel Gurrola MD

## 2024-07-02 NOTE — CARE PLAN
SW consulted re: pt's self pay status.  Met with pt who states he has [place] Medicaid.  Let him know [place] Medicaid does not pay in [place] and he questions why he was brought to an [place] hospital.  I asked if he would be willing to talk to HRS or if they should talk to his mother.  He states HRS should talk to his friend Alberta.  Notified HRS to call [place].  Gilmer Bateman RN found out pt is on food assistance in [place] and that pt intends to file for Disability.  Will give pt information about filing for disability.  Will follow.  ADDED 0406 [facility] told me they could not get a hold of Alberta and asked me to see if pt had [place] Medicaid card in his wallet, he did not.  If Alberta comes in to take pt home, I will see if she has pt's [place] insurance card.  ADDED 8504 - Gave pt information on [place] Disability.  Suggested he go to [employer] office for assistance in applying.

## 2024-07-02 NOTE — PROGRESS NOTES
Vancomycin Dosing by Pharmacy- Cessation of Therapy    Consult to pharmacy for vancomycin dosing has been discontinued by the prescriber, pharmacy will sign off at this time.    Please call pharmacy if there are further questions or re-enter a consult if vancomycin is resumed.     Oumou Stone, [facility]     Vancomycin Dosing by Pharmacy- FOLLOW UP    Jim Forte is a 53 y.o. year old male who Pharmacy has been consulted for vancomycin dosing for empiric treatment. Based on the patient's indication and renal status this patient is being dosed based on a goal trough/random level of 15-20.     Renal function is currently improving.    Current vancomycin dose: 1000 mg given every 24 hours    Estimated vancomycin AUC on current dose: 275 mg/L.hr     Visit Vitals  /72   Pulse 78   Temp 37.2 °C (99 °F) (Temporal)   Resp 20        Lab Results   Component Value Date    CREATININE 0.98 2024    CREATININE 1.07 2024    CREATININE 1.13 2024    CREATININE 1.32 (H) 2024        Patient weight is as follows:   Vitals:    24 0800   Weight: 71.8 kg (158 lb 4.6 oz)       Cultures:  No results found for the encounter in last 14 days.       I/O last 3 completed shifts:  In: 6403.8 (89.2 mL/kg) [I.V.:3053.8 (42.5 mL/kg); IV Piggyback:3350]  Out: 205 (28.6 mL/kg) [Urine:2050 (0.8 mL/kg/hr)]  Weight: 71.8 kg   I/O during current shift:  I/O this shift:  In: 720 [P.O.:720]  Out: 1285 [Urine:1285]    Temp (24hrs), Av.2 °C (98.9 °F), Min:36.6 °C (97.9 °F), Max:37.5 °C (99.5 °F)      Assessment/Plan    Below goal AUC. Orders placed for new vancomcyin regimen of 1000mg every 12 hours to begin at 1300.     This dosing regimen is predicted by [employer] to result in the following pharmacokinetic parameters:    Regimen: 1000 mg IV every 12 hours.  Start time: 01:38 on 2024  Exposure target: AUC24 (range)400-600 mg/L.hr   AUC24,ss: 535 mg/L.hr  Probability of AUC24 > 400: 90 %  Ctrough,ss: 17.5  mg/L  Probability of Ctrough,ss > 20: 33 %  Probability of nephrotoxicity (Lodise JAXSON 2009): 13 %    The next level will be obtained on 7/3 at 5a. May be obtained sooner if clinically indicated.   Will continue to monitor renal function daily while on vancomycin and order serum creatinine at least every 48 hours if not already ordered.  Follow for continued vancomycin needs, clinical response, and signs/symptoms of toxicity.       Nacho Piña, PharmD

## 2024-07-02 NOTE — PROGRESS NOTES
07/02/24 1600   Discharge Planning   Patient expects to be discharged to: Patient's ride was confrimed for 9:30 PM and patient is aware. Patient will try to find another ride home as he does not want to wait that long. Bedside nurse will cancel [facility] WC van if patient is able to find another ride after this [facility] leaves for the day.   Does the patient need discharge transport arranged? Yes   RoundTrip coordination needed? Yes   Has discharge transport been arranged? Yes   What day is the transport expected? 07/02/24   What time is the transport expected? 2130

## 2024-07-02 NOTE — DISCHARGE INSTRUCTIONS
Follow up with your PCP about restarting your blood pressure medication, lisinopril. We are holding it right now because your blood pressure is not high.   Follow up with your PCP and/or psychiatry for depression.   We will try to get you in touch with an endocrinologist near your home.     Continue insulin glargine U300 (Toujeo Solostar) at 45 units nightly   Take insulin lispro 10 units three times a day with meals.

## 2024-07-05 LAB
BACTERIA BLD CULT: NORMAL
BACTERIA BLD CULT: NORMAL

## 2024-07-05 NOTE — SIGNIFICANT EVENT
Follow Up Phone Call    Outgoing phone call    Spoke to: Jim Forte Relationship:self   Phone number: [phone]      Outcome: I left a message on answering machine   No chief complaint on file.         Diagnosis:Not applicable

## 2024-07-08 LAB
ATRIAL RATE: 78 BPM
P AXIS: -15 DEGREES
P OFFSET: 191 MS
P ONSET: 138 MS
PR INTERVAL: 174 MS
Q ONSET: 225 MS
QRS COUNT: 13 BEATS
QRS DURATION: 86 MS
QT INTERVAL: 438 MS
QTC CALCULATION(BAZETT): 499 MS
QTC FREDERICIA: 478 MS
R AXIS: 40 DEGREES
T AXIS: 3 DEGREES
T OFFSET: 444 MS
VENTRICULAR RATE: 78 BPM

## 2024-07-09 NOTE — DOCUMENTATION CLARIFICATION NOTE
"    PATIENT:               SABA HOBSON  ACCT #:                  [id number]  MRN:                       [id number]  :                       1970  ADMIT DATE:       2024 3:37 AM  DISCH DATE:        2024 6:39 PM  RESPONDING PROVIDER #:        [id number]          PROVIDER RESPONSE TEXT:    Sepsis was a differential diagnosis and ruled out after study    CDI QUERY TEXT:    Clarification      Instruction:  Based on your assessment of the patient and the clinical information, please provide the requested documentation by clicking on the appropriate radio button and enter any additional information if prompted.    Question: Sepsis was documented in the medical record. Based on the documentation and the clinical information, can the diagnosis be further clarified as    When answering this query, please exercise your independent professional judgment. The fact that a question is being asked, does not imply that any particular answer is desired or expected.    The patient's clinical indicators include:  Clinical Information: 53 y.o. male presenting with DKA, type 1.    Documented Diagnosis: Sepsis    Clinical Indicators:  -Vital Signs: Tmax 37.3, HR 93/94  -WBC: 24.2/12.6  -Lactic acid: 4.4/5.5/5.4/2.2/4.0  -Creat: 1.94/1.65/1.32/1.13/1.07  -Blood cultures: negative  -Other clinical indicators:  PN  noted \"Acute metabolic encephalopathy:: multifactorial: DKA vs ?OD vs Sepsis. Concern for sepsis-SIRS tachycardia ( bpm), tachypnea RR 24, wbc 13, lactic acidosis, unclear source of infection. S/p vanc and zosyn in the ED.\"      Treatment: Vancomycin 1000mg IV, LR 250ml/hr, NaCl 1000ml IV bolus, Zosyn 3.375g IV    Risk Factors: DKA, MIKE, IV drug user  Options provided:  -- Sepsis was a differential diagnosis and ruled out after study  -- SIRS with acute organ dysfunction of MIKE  -- Other - I will add my own diagnosis  -- Refer to Clinical Documentation Reviewer    Query created by: Marybeth Davila on 2024 " 2:16 PM      Electronically signed by:  GWENDOLYN MCCARTHY DO 7/9/2024 2:24 PM

## 2024-07-10 LAB
ATRIAL RATE: 191 BPM
P AXIS: 68 DEGREES
Q ONSET: 206 MS
QRS COUNT: 32 BEATS
QRS DURATION: 106 MS
QT INTERVAL: 270 MS
QTC CALCULATION(BAZETT): 481 MS
QTC FREDERICIA: 397 MS
R AXIS: -44 DEGREES
T AXIS: 63 DEGREES
T OFFSET: 341 MS
VENTRICULAR RATE: 191 BPM

## 2024-07-26 NOTE — ED PROCEDURE NOTE
Procedure  Critical Care    Performed by: Myrtle Liriano MD  Authorized by: Myrtle Liriano MD    Critical care provider statement:     Critical care time (minutes): 45.    Critical care time was exclusive of:  Separately billable procedures and treating other patients    Critical care was necessary to treat or prevent imminent or life-threatening deterioration of the following conditions:  Circulatory failure and respiratory failure    Critical care was time spent personally by me on the following activities:  Blood draw for specimens, development of treatment plan with patient or surrogate, evaluation of patient's response to treatment, examination of patient, obtaining history from patient or surrogate, ordering and performing treatments and interventions, ordering and review of laboratory studies, ordering and review of radiographic studies, pulse oximetry, re-evaluation of patient's condition, review of old charts, vascular access procedures and ventilator management    Care discussed with: accepting provider at another facility                 Myrtle Liriano MD  07/26/24 0710